# Patient Record
Sex: MALE | Race: BLACK OR AFRICAN AMERICAN | Employment: UNEMPLOYED | URBAN - METROPOLITAN AREA
[De-identification: names, ages, dates, MRNs, and addresses within clinical notes are randomized per-mention and may not be internally consistent; named-entity substitution may affect disease eponyms.]

---

## 2018-01-18 NOTE — PROGRESS NOTES
Assessment    1  Encounter for preventive health examination (V70 0) (Z00 00)   2  ADD (attention deficit disorder) without hyperactivity (314 00) (F90 0)    Plan  ADD (attention deficit disorder) without hyperactivity    · Amphetamine-Dextroamphet ER 10 MG Oral Capsule Extended Release 24 Hour;  ONE TABLET IN THE MORNING DAILY   · Follow-up visit in 1 month Evaluation and Treatment  Follow-up  Status: Complete  Done:  [id number]  Health Maintenance    · Always use a seat belt and shoulder strap when riding or driving a motor vehicle ;  Status:Complete;   Done: 82JSK9810   · Brush your teeth 3 times a day and floss at least once a day ; Status:Complete;   Done:  60XCG4152   · Decreasing the stress in your life may help your condition improve ; Status:Complete;    Done: 61PSA7620   · Follow-up visit in 1 year Evaluation and Treatment  Follow-up  Status: Complete  Done:  36DYO2214    Discussion/Summary  Impression: healthy adult male  Currently, he eats a healthy diet  Prostate cancer screening: PSA is not indicated  Colorectal cancer screening: colorectal cancer screening is not indicated  Advice and education were given regarding seat belt use  Chief Complaint  HM      History of Present Illness  HM, Adult Male: The patient is being seen for a health maintenance evaluation  The last health maintenance visit was 3 year(s) ago  General Health: The patient's health since the last visit is described as good  Screening:   HPI: Wondering about restarting Adderall for ADD as he had in high school  Able to sleep well  Has trouble with focus, trouble completing work tasks, trouble concentrating on conversation      Review of Systems    Constitutional: not feeling poorly, no recent weight gain, not feeling tired and no recent weight loss  Eyes: no eyesight problems  ENT: no nasal discharge  Cardiovascular: no chest pain and no palpitations  Respiratory: no cough and no shortness of breath during exertion  Gastrointestinal: no abdominal pain  Musculoskeletal: no arthralgias and no myalgias  Integumentary: no rashes  Neurological: no headache, no dizziness and no fainting  Psychiatric: no anxiety, no sleep disturbances and no depression  Hematologic/Lymphatic: no tendency for easy bruising  Active Problems    1  Cough (786 2) (R05)   2  Depression (311) (F32 9)   3  Flu-like symptoms (780 99) (R68 89)   4  Streptococcal pharyngitis (034 0) (J02 0)    Past Medical History    · History of Asthma (493 90) (J45 909)   · History of Dysuria (788 1) (R30 0)   · Streptococcal pharyngitis (034 0) (J02 0)    Family History    · Family history of Reflux   · Family history of Rhinitis    · No pertinent family history    · Family history of Allergic disorder   · Family history of Asthma    Social History    · Current some day smoker (305 1) (F17 210)   · Full-time employment   · Marijuana   · Non drinker / no alcohol use   · Single    Current Meds   1  Fluticasone Propionate 50 MCG/ACT Nasal Suspension; USE 2 SPRAYS IN EACH   NOSTRIL ONCE DAILY; Therapy: 28BVY9292 to (Evaluate:51Hkq8773)  Requested for: 84TPM5311; Last   Rx:21Nov2014 Ordered    Allergies    1  No Known Drug Allergies    Vitals   Recorded: 16HNK6456 08:52AM   Temperature 98 F, Tympanic   Heart Rate 60, L Radial   Pulse Quality Regular   Systolic 548, LUE, Sitting   Diastolic 74, LUE, Sitting   Height 5 ft 11 in   Weight 150 lb 3 2 oz   BMI Calculated 20 95   BSA Calculated 1 86     Physical Exam    Constitutional   General appearance: No acute distress, well appearing and well nourished  Head and Face   Head and face: Normal     Eyes   Conjunctiva and lids: No erythema, swelling or discharge  Ears, Nose, Mouth, and Throat   External inspection of ears and nose: Normal     Otoscopic examination: Tympanic membranes translucent with normal light reflex  Canals patent without erythema      Hearing: Normal     Nasal mucosa, septum, and turbinates: Normal without edema or erythema  Lips, teeth, and gums: Normal, good dentition  Oropharynx: Normal with no erythema, edema, exudate or lesions  Neck   Neck: Supple, symmetric, trachea midline, no masses  Thyroid: Normal, no thyromegaly  Pulmonary   Respiratory effort: No increased work of breathing or signs of respiratory distress  Auscultation of lungs: Clear to auscultation  Cardiovascular   Auscultation of heart: Normal rate and rhythm, normal S1 and S2, no murmurs  Carotid pulses: 2+ bilaterally  Peripheral vascular exam: Normal     Examination of extremities for edema and/or varicosities: Normal     Abdomen   Abdomen: Non-tender, no masses  Liver and spleen: No hepatomegaly or splenomegaly  Lymphatic   Palpation of lymph nodes in neck: No lymphadenopathy  Musculoskeletal   Gait and station: Normal     Inspection/palpation of digits and nails: Normal without clubbing or cyanosis  Inspection/palpation of joints, bones, and muscles: Normal     Range of motion: Normal     Stability: Normal     Muscle strength/tone: Normal     Skin   Skin and subcutaneous tissue: Normal without rashes or lesions  Psychiatric   Judgment and insight: Normal     Orientation to person, place and time: Normal     Recent and remote memory: Intact      Mood and affect: Normal        Results/Data  PHQ-2 Adult Depression Screening 25Mar2016 09:35AM User, Ahs     Test Name Result Flag Reference   PHQ-2 Adult Depression Score 0     Q1: 0, Q2: 0   PHQ-2 Adult Depression Screening Negative         Signatures   Electronically signed by : Teresa Parham MD; Mar 27 2016  8:33PM EST                       (Author)

## 2018-01-18 NOTE — MISCELLANEOUS
Message   Recorded as Task   Date: 04/20/2016 04:53 PM, Created By: Christian Gutiérrez   Task Name: Call Back   Assigned To: [address] [place] [address]   Regarding Patient: Leilani Sal, Status: Active   Comment:    Christian Gutiérrez - 20 Apr 2016 4:53 PM     TASK CREATED  Patient calling because the Vyvanse is too expensive  He is asking for an alternative  Nelly Thorne - 21 Apr 2016 10:03 AM     TASK EDITED  pt mom called back that the Vyvanse is $30 per month - which is too much    is there a generic of this or something else that he can take    pcb @ [phone]  (this is mom's work #)   Francisco Michel - 22 Apr 2016 7:33 AM     TASK REPLIED TO: Previously Assigned To [address] [place] [address]  We can continue the Adderall as a twice a day dose  New Rx on printer  Angeles Scales - 22 Apr 2016 11:15 AM     TASK EDITED  Patient aware        Active Problems    1  ADD (attention deficit disorder) without hyperactivity (314 00) (F90 0)   2  Cough (786 2) (R05)   3  Depression (311) (F32 9)   4  Flu-like symptoms (780 99) (R68 89)   5  Streptococcal pharyngitis (034 0) (J02 0)    Current Meds   1  Amphetamine-Dextroamphet ER 10 MG Oral Capsule Extended Release 24 Hour; Take   1 capsule twice daily with breakfast and with lunch; Therapy: 63RPY2809 to (Last Rx:22Apr2016) Ordered   2  Fluticasone Propionate 50 MCG/ACT Nasal Suspension; USE 2 SPRAYS IN EACH   NOSTRIL ONCE DAILY; Therapy: 29IJY8910 to (Evaluate:27Coa2916)  Requested for: 37EYF1124; Last   Rx:21Nov2014 Ordered    Allergies    1   No Known Drug Allergies    Signatures   Electronically signed by : Mari Gupta MD; Apr 22 2016 11:20AM EST                       (Co-author)

## 2018-02-14 ENCOUNTER — OFFICE VISIT (OUTPATIENT)
Dept: FAMILY MEDICINE CLINIC | Facility: HOSPITAL | Age: 23
End: 2018-02-14
Payer: COMMERCIAL

## 2018-02-14 VITALS
HEIGHT: 72 IN | SYSTOLIC BLOOD PRESSURE: 112 MMHG | DIASTOLIC BLOOD PRESSURE: 76 MMHG | TEMPERATURE: 99.6 F | BODY MASS INDEX: 20.4 KG/M2 | HEART RATE: 100 BPM | WEIGHT: 150.6 LBS

## 2018-02-14 DIAGNOSIS — Z00.00 WELLNESS EXAMINATION: Primary | ICD-10-CM

## 2018-02-14 DIAGNOSIS — F98.8 ATTENTION DEFICIT DISORDER, UNSPECIFIED HYPERACTIVITY PRESENCE: ICD-10-CM

## 2018-02-14 PROCEDURE — 99395 PREV VISIT EST AGE 18-39: CPT | Performed by: FAMILY MEDICINE

## 2018-02-14 RX ORDER — FLUTICASONE PROPIONATE 50 MCG
2 SPRAY, SUSPENSION (ML) NASAL DAILY
COMMUNITY
Start: 2011-05-05 | End: 2020-06-02 | Stop reason: HOSPADM

## 2018-02-14 NOTE — PROGRESS NOTES
Assessment/Plan:       Diagnoses and all orders for this visit:    Wellness examination    Attention deficit disorder, unspecified hyperactivity presence  Comments:  OK to retrial of Vyvanse  Orders:  -     lisdexamfetamine (VYVANSE) 30 MG capsule; Take 1 capsule (30 mg total) by mouth daily Max Daily Amount: 30 mg    Other orders  -     Discontinue: lisdexamfetamine (VYVANSE) 30 MG capsule; Take 1 capsule by mouth Daily  -     fluticasone (FLONASE) 50 mcg/act nasal spray; 2 sprays into each nostril daily          Subjective:      Patient ID: Basilio Malhotra is a 25 y o  male  Wondered about restarting the Vyvanse for ADD  He never restarted at last visit because he couldn't afford it  Still having focus difficulties  Not currently employed         The following portions of the patient's history were reviewed and updated as appropriate: allergies, current medications, past family history, past medical history, past social history, past surgical history and problem list     Review of Systems   Constitutional: Negative for chills, fatigue and fever  HENT: Negative for dental problem and postnasal drip  Eyes: Negative for pain  Respiratory: Negative for chest tightness, shortness of breath and wheezing  Cardiovascular: Negative for chest pain  Genitourinary: Negative for testicular pain  Neurological: Negative for light-headedness  Hematological: Does not bruise/bleed easily  Psychiatric/Behavioral: Positive for decreased concentration  The patient is not nervous/anxious  Objective:    Vitals:    02/14/18 1459   BP: 112/76   Pulse: 100   Temp: 99 6 °F (37 6 °C)        Physical Exam   Constitutional: He is oriented to person, place, and time  He appears well-developed and well-nourished  HENT:   Head: Normocephalic and atraumatic     Right Ear: External ear normal    Left Ear: External ear normal    Nose: Nose normal    Mouth/Throat: Oropharynx is clear and moist    Eyes: Conjunctivae are normal    Neck: No thyromegaly present  Cardiovascular: Normal rate, regular rhythm, normal heart sounds and intact distal pulses  Pulmonary/Chest: Effort normal and breath sounds normal    Abdominal: Soft  Bowel sounds are normal    Musculoskeletal: Normal range of motion  Neurological: He is alert and oriented to person, place, and time  Psychiatric: He has a normal mood and affect   His behavior is normal  Judgment and thought content normal

## 2018-02-15 PROBLEM — Z00.00 WELLNESS EXAMINATION: Status: ACTIVE | Noted: 2018-02-15

## 2018-02-15 PROBLEM — F98.8 ATTENTION DEFICIT DISORDER: Status: ACTIVE | Noted: 2018-02-15

## 2018-03-14 ENCOUNTER — OFFICE VISIT (OUTPATIENT)
Dept: FAMILY MEDICINE CLINIC | Facility: HOSPITAL | Age: 23
End: 2018-03-14
Payer: COMMERCIAL

## 2018-03-14 VITALS
WEIGHT: 152 LBS | HEIGHT: 72 IN | DIASTOLIC BLOOD PRESSURE: 70 MMHG | HEART RATE: 68 BPM | BODY MASS INDEX: 20.59 KG/M2 | SYSTOLIC BLOOD PRESSURE: 126 MMHG | TEMPERATURE: 98.8 F

## 2018-03-14 DIAGNOSIS — F98.8 ADD (ATTENTION DEFICIT DISORDER) WITHOUT HYPERACTIVITY: Primary | ICD-10-CM

## 2018-03-14 PROCEDURE — 3008F BODY MASS INDEX DOCD: CPT | Performed by: FAMILY MEDICINE

## 2018-03-14 PROCEDURE — 99213 OFFICE O/P EST LOW 20 MIN: CPT | Performed by: FAMILY MEDICINE

## 2018-03-14 RX ORDER — DEXTROAMPHETAMINE SACCHARATE, AMPHETAMINE ASPARTATE, DEXTROAMPHETAMINE SULFATE AND AMPHETAMINE SULFATE 3.75; 3.75; 3.75; 3.75 MG/1; MG/1; MG/1; MG/1
15 TABLET ORAL 2 TIMES DAILY
Qty: 60 TABLET | Refills: 0 | Status: SHIPPED | OUTPATIENT
Start: 2018-03-14 | End: 2020-06-02 | Stop reason: HOSPADM

## 2018-03-14 NOTE — PROGRESS NOTES
Assessment/Plan:         Diagnoses and all orders for this visit:    ADD (attention deficit disorder) without hyperactivity  -     amphetamine-dextroamphetamine (ADDERALL) 15 MG tablet; Take 1 tablet (15 mg total) by mouth 2 (two) times a day Max Daily Amount: 30 mg          Subjective:      Patient ID: Arnulfo Stevens is a 21 y o  male  Feels he is getting too long a duration of action with Vyvanse to 10 pm   He feels he was doing better with the Adderall twice a day as before        The following portions of the patient's history were reviewed and updated as appropriate: allergies, current medications, past family history, past medical history, past social history, past surgical history and problem list     Review of Systems   Constitutional: Negative for fatigue  HENT: Negative for congestion  Eyes: Negative for pain  Respiratory: Negative for cough and shortness of breath  Cardiovascular: Negative for chest pain  Gastrointestinal: Negative for constipation and diarrhea  Genitourinary: Negative for difficulty urinating  Neurological: Negative for headaches  Objective:      /70 (BP Location: Left arm, Patient Position: Sitting, Cuff Size: Standard)   Pulse 68   Temp 98 8 °F (37 1 °C) (Tympanic)   Ht 5' 11 5" (1 816 m)   Wt 68 9 kg (152 lb)   BMI 20 90 kg/m²          Physical Exam   Constitutional: He is oriented to person, place, and time  He appears well-developed and well-nourished  Eyes: Conjunctivae are normal    Cardiovascular: Normal rate, regular rhythm, normal heart sounds and intact distal pulses  Pulmonary/Chest: Effort normal and breath sounds normal    Neurological: He is alert and oriented to person, place, and time  Psychiatric: He has a normal mood and affect   His behavior is normal  Judgment and thought content normal

## 2018-03-16 NOTE — PATIENT INSTRUCTIONS
ADHD in Adults   AMBULATORY CARE:   Attention deficit hyperactivity disorder (ADHD)  is a condition that affects behavior  You may be overactive and have a short attention span  ADHD interferes with how you function in your daily activities at work, school, or home  ADHD may also cause you to have problems getting along with other people  Signs and symptoms of ADHD:  ADHD has 2 main types, based on signs and symptoms  You may have a combination of the 2 main types  A combination is the most common type of ADHD  · Inattention:      ¨ Get easily distracted or have a hard time focusing    ¨ Avoid tasks that need full attention    ¨ Not follow or easily forget instructions or directions    ¨ Not listen, or drift away when spoken to    ¨ Make careless mistakes or lose things    ¨ Have problems organizing tasks or chores and managing time    · Hyperactivity and impulsivity:      ¨ Become easily bored and not finish tasks    ¨ Talk a lot, interrupt, or intrude into conversations or games    ¨ Change schools or jobs often    ¨ Feel stressed, nervous, or worried much of the time    ¨ Have problems doing quiet activities or sitting still    ¨ Have an addictive behavior such as use of alcohol or illegal drugs, shopping, eating, or working too much    ¨ Have more energy than seems normal  Call 911 if:   · You feel like hurting yourself or someone else  Seek care immediately if:   · You have a seizure  · You have trouble breathing, chest pains, or a fast heartbeat  Contact your healthcare provider if:   · You feel you cannot cope at home, work, or school  · You have new symptoms since the last time you visited your healthcare provider  · Your symptoms are getting worse  · You have questions or concerns about your condition or care  Treatment for ADHD:  The goal of treatment is to help you learn how to control your behavior  A combination of therapy and medication is usually most effective for treating ADHD  You may need any of the following:  · Behavior therapy  is used to help you learn to control your actions and improve your behavior  This is done by teaching you how to change your behavior by looking at the results of your actions  · Psychotherapy  is also called talk therapy  You may have one-on-one visits with a therapist or with others in a group setting  · Stimulants  help you pay attention, concentrate better, and manage your energy  · Antidepressants  help decrease or prevent depression or anxiety  It can also be used to treat other behavior problems  Manage ADHD:   · Reduce stress  Stress may make your ADHD worse  Ask about ways to calm your body and mind  These may include deep breathing, muscle relaxation, music, and biofeedback  Talk to someone about things that upset you  · Learn more about ADHD  The more you know about ADHD, the better you will be able to help yourself  Read books, work with your therapist, and find the support of other people with ADHD  · Do not drink alcohol  Alcohol may make your symptoms worse  · Create a regular sleep schedule  Sleep can help decrease your symptoms  Try to go to bed and wake up at the same times each day  Do not watch TV, use the computer, or play video games before bed  Electronic devices can make it hard for you to sleep or to stay asleep  · Eat a variety of healthy foods  Healthy foods can help increase your concentration and make you feel calmer  Healthy foods include fruits, vegetables, low-fat dairy products, lean meats, fish, whole-grain breads, and cooked beans  Ask your healthcare provider if you need to be on a special diet  Follow up with your healthcare provider as directed:  Write down your questions so you remember to ask them during your visits  © 2017 [employer] Information is for End User's use only and may not be sold, redistributed or otherwise used for commercial purposes   All illustrations and images included in CareNotes® are the copyrighted property of [employer]  or [employer]  The above information is an  only  It is not intended as medical advice for individual conditions or treatments  Talk to your doctor, nurse or pharmacist before following any medical regimen to see if it is safe and effective for you

## 2018-04-19 ENCOUNTER — TELEPHONE (OUTPATIENT)
Dept: FAMILY MEDICINE CLINIC | Facility: HOSPITAL | Age: 23
End: 2018-04-19

## 2018-04-21 NOTE — TELEPHONE ENCOUNTER
Adderal not working, states he feels confused on it  Not sure if he needs brand name   Please advise, TY

## 2018-04-21 NOTE — TELEPHONE ENCOUNTER
Brand name would not help  I dont feel this is the right treatment for him and I would like him to see a neurologist to determine if he really does have ADD  Refer to Dr Roberta Campos

## 2019-10-07 ENCOUNTER — TELEPHONE (OUTPATIENT)
Dept: FAMILY MEDICINE CLINIC | Facility: HOSPITAL | Age: 24
End: 2019-10-07

## 2020-01-07 ENCOUNTER — OFFICE VISIT (OUTPATIENT)
Dept: FAMILY MEDICINE CLINIC | Facility: HOSPITAL | Age: 25
End: 2020-01-07
Payer: COMMERCIAL

## 2020-01-07 ENCOUNTER — TELEPHONE (OUTPATIENT)
Dept: FAMILY MEDICINE CLINIC | Facility: HOSPITAL | Age: 25
End: 2020-01-07

## 2020-01-07 VITALS
TEMPERATURE: 99 F | BODY MASS INDEX: 19.61 KG/M2 | DIASTOLIC BLOOD PRESSURE: 78 MMHG | SYSTOLIC BLOOD PRESSURE: 138 MMHG | WEIGHT: 148 LBS | HEART RATE: 80 BPM | HEIGHT: 73 IN

## 2020-01-07 DIAGNOSIS — F41.9 ANXIETY: Primary | ICD-10-CM

## 2020-01-07 DIAGNOSIS — F51.05 INSOMNIA SECONDARY TO ANXIETY: ICD-10-CM

## 2020-01-07 DIAGNOSIS — F41.9 INSOMNIA SECONDARY TO ANXIETY: ICD-10-CM

## 2020-01-07 PROCEDURE — 3008F BODY MASS INDEX DOCD: CPT | Performed by: FAMILY MEDICINE

## 2020-01-07 PROCEDURE — 99213 OFFICE O/P EST LOW 20 MIN: CPT | Performed by: FAMILY MEDICINE

## 2020-01-07 RX ORDER — HYDROXYZINE HYDROCHLORIDE 10 MG/1
10 TABLET, FILM COATED ORAL
Qty: 15 TABLET | Refills: 0 | Status: SHIPPED | OUTPATIENT
Start: 2020-01-07 | End: 2020-02-18 | Stop reason: SDUPTHER

## 2020-01-07 RX ORDER — ESCITALOPRAM OXALATE 10 MG/1
10 TABLET ORAL DAILY
Qty: 30 TABLET | Refills: 1 | Status: SHIPPED | OUTPATIENT
Start: 2020-01-07 | End: 2020-02-18 | Stop reason: SDUPTHER

## 2020-01-07 NOTE — PROGRESS NOTES
Assessment/Plan:         Diagnoses and all orders for this visit:    Anxiety  -     escitalopram (LEXAPRO) 10 mg tablet; Take 1 tablet (10 mg total) by mouth daily    Insomnia secondary to anxiety  -     hydrOXYzine HCL (ATARAX) 10 mg tablet; Take 1 tablet (10 mg total) by mouth daily at bedtime          Subjective:      Patient ID: Earnest Moreno is a 25 y o  male  Visit to evaluate anxiety especially occurring in social situations    Trouble staying asleep as well, wakes several times a night  We had tried Adderall for prior ADD symptoms but did not do well with it  He is not employed and really doesn't do much except hang around home  The following portions of the patient's history were reviewed and updated as appropriate: allergies, current medications, past family history, past medical history, past social history, past surgical history and problem list     Review of Systems   Constitutional: Negative for unexpected weight change  HENT: Negative  Respiratory: Negative  Genitourinary: Negative  Musculoskeletal: Negative  Skin: Negative  Neurological: Negative  Hematological: Negative  Psychiatric/Behavioral: Positive for dysphoric mood and sleep disturbance  Negative for agitation and suicidal ideas  The patient is nervous/anxious  All other systems reviewed and are negative  Objective:      /78   Pulse 80   Temp 99 °F (37 2 °C)   Ht 6' 1" (1 854 m)   Wt 67 1 kg (148 lb)   BMI 19 53 kg/m²          Physical Exam   Constitutional: He is oriented to person, place, and time  He appears well-developed and well-nourished  Cardiovascular: Normal rate, regular rhythm, normal heart sounds and intact distal pulses  Pulmonary/Chest: Effort normal and breath sounds normal    Musculoskeletal: He exhibits no edema  Neurological: He is alert and oriented to person, place, and time  Nursing note and vitals reviewed

## 2020-01-07 NOTE — TELEPHONE ENCOUNTER
MOM CALLED EXPRESSED CONCERNS FOR  DEPRESSION  WANTS A REFERRAL TO  OR     THERAPIST   GAVE THEM THE NUMBER TO [employer]  PHONE AND ADDRESS  [phone]     ENCOURAGED THE WALK IN CLINIC AT [address] [facility] TO TAKE     TO ED FOR EVALUATION    PLEASE ADVISE  THANKS

## 2020-01-07 NOTE — TELEPHONE ENCOUNTER
Mom asking for a call from Dr Raúl Chun  She is concerned regarding patient and his depression and possible suicidal tendency  No communication consent in chart

## 2020-02-18 ENCOUNTER — OFFICE VISIT (OUTPATIENT)
Dept: FAMILY MEDICINE CLINIC | Facility: HOSPITAL | Age: 25
End: 2020-02-18
Payer: COMMERCIAL

## 2020-02-18 VITALS
HEART RATE: 92 BPM | TEMPERATURE: 98.7 F | DIASTOLIC BLOOD PRESSURE: 80 MMHG | BODY MASS INDEX: 19.48 KG/M2 | HEIGHT: 73 IN | WEIGHT: 147 LBS | SYSTOLIC BLOOD PRESSURE: 120 MMHG

## 2020-02-18 DIAGNOSIS — F41.9 INSOMNIA SECONDARY TO ANXIETY: ICD-10-CM

## 2020-02-18 DIAGNOSIS — F41.9 ANXIETY: Primary | ICD-10-CM

## 2020-02-18 DIAGNOSIS — F51.05 INSOMNIA SECONDARY TO ANXIETY: ICD-10-CM

## 2020-02-18 PROCEDURE — 99213 OFFICE O/P EST LOW 20 MIN: CPT | Performed by: FAMILY MEDICINE

## 2020-02-18 PROCEDURE — 3008F BODY MASS INDEX DOCD: CPT | Performed by: FAMILY MEDICINE

## 2020-02-18 RX ORDER — HYDROXYZINE HYDROCHLORIDE 25 MG/1
25 TABLET, FILM COATED ORAL
Qty: 30 TABLET | Refills: 1 | Status: SHIPPED | OUTPATIENT
Start: 2020-02-18 | End: 2020-03-23

## 2020-02-18 RX ORDER — ESCITALOPRAM OXALATE 10 MG/1
15 TABLET ORAL DAILY
Qty: 45 TABLET | Refills: 3 | Status: SHIPPED | OUTPATIENT
Start: 2020-02-18 | End: 2020-06-02 | Stop reason: HOSPADM

## 2020-02-18 NOTE — PROGRESS NOTES
Assessment/Plan:         Diagnoses and all orders for this visit:    Anxiety  -     escitalopram (LEXAPRO) 10 mg tablet; Take 1 5 tablets (15 mg total) by mouth daily    Insomnia secondary to anxiety  -     hydrOXYzine HCL (ATARAX) 25 mg tablet; Take 1 tablet (25 mg total) by mouth daily at bedtime          Subjective:      Patient ID: Sophia Marion is a 25 y o  male  6 week follow up    Started Lexapro for social anxiety and insomnia issues  Feels better able to deal with situations and doesn't get as upset  Takes it in the morning although he hasnt been on it for 2 weeks because ehe didn't get the refill    Taking Hydroxyzine at bedtime without much benefit  The following portions of the patient's history were reviewed and updated as appropriate: allergies, current medications, past family history, past medical history, past social history, past surgical history and problem list     Review of Systems   Constitutional: Negative for unexpected weight change  HENT: Negative  Hematological: Negative  Psychiatric/Behavioral: Positive for sleep disturbance  Negative for confusion  The patient is nervous/anxious  All other systems reviewed and are negative  Objective:      /80   Pulse 92   Temp 98 7 °F (37 1 °C)   Ht 6' 1" (1 854 m)   Wt 66 7 kg (147 lb)   BMI 19 39 kg/m²          Physical Exam   Constitutional: He is oriented to person, place, and time  He appears well-developed and well-nourished  Musculoskeletal: He exhibits no edema  Neurological: He is alert and oriented to person, place, and time  Psychiatric: He has a normal mood and affect  His behavior is normal  Judgment and thought content normal    Nursing note and vitals reviewed

## 2020-03-23 DIAGNOSIS — F41.9 INSOMNIA SECONDARY TO ANXIETY: ICD-10-CM

## 2020-03-23 DIAGNOSIS — F51.05 INSOMNIA SECONDARY TO ANXIETY: ICD-10-CM

## 2020-03-23 RX ORDER — HYDROXYZINE HYDROCHLORIDE 25 MG/1
TABLET, FILM COATED ORAL
Qty: 30 TABLET | Refills: 1 | Status: SHIPPED | OUTPATIENT
Start: 2020-03-23 | End: 2020-06-02 | Stop reason: HOSPADM

## 2020-05-15 ENCOUNTER — OFFICE VISIT (OUTPATIENT)
Dept: FAMILY MEDICINE CLINIC | Facility: HOSPITAL | Age: 25
End: 2020-05-15
Payer: COMMERCIAL

## 2020-05-15 VITALS
BODY MASS INDEX: 18.55 KG/M2 | HEIGHT: 73 IN | SYSTOLIC BLOOD PRESSURE: 108 MMHG | WEIGHT: 140 LBS | DIASTOLIC BLOOD PRESSURE: 70 MMHG | TEMPERATURE: 98 F | HEART RATE: 85 BPM

## 2020-05-15 DIAGNOSIS — R44.0 AUDITORY HALLUCINATIONS: Primary | ICD-10-CM

## 2020-05-15 DIAGNOSIS — S09.90XS INJURY OF HEAD, SEQUELA: ICD-10-CM

## 2020-05-15 DIAGNOSIS — F51.05 INSOMNIA SECONDARY TO ANXIETY: ICD-10-CM

## 2020-05-15 DIAGNOSIS — F41.9 INSOMNIA SECONDARY TO ANXIETY: ICD-10-CM

## 2020-05-15 DIAGNOSIS — R41.82 ALTERED MENTAL STATUS, UNSPECIFIED ALTERED MENTAL STATUS TYPE: ICD-10-CM

## 2020-05-15 DIAGNOSIS — F41.9 ANXIETY: ICD-10-CM

## 2020-05-15 PROBLEM — S09.90XA HEAD INJURY: Status: ACTIVE | Noted: 2020-05-15

## 2020-05-15 PROCEDURE — 99214 OFFICE O/P EST MOD 30 MIN: CPT | Performed by: FAMILY MEDICINE

## 2020-05-15 PROCEDURE — 3008F BODY MASS INDEX DOCD: CPT | Performed by: FAMILY MEDICINE

## 2020-05-22 ENCOUNTER — TELEPHONE (OUTPATIENT)
Dept: FAMILY MEDICINE CLINIC | Facility: HOSPITAL | Age: 25
End: 2020-05-22

## 2020-05-22 ENCOUNTER — HOSPITAL ENCOUNTER (OUTPATIENT)
Dept: CT IMAGING | Facility: HOSPITAL | Age: 25
Discharge: HOME | End: 2020-05-22
Payer: COMMERCIAL

## 2020-05-22 DIAGNOSIS — R41.82 ALTERED MENTAL STATUS, UNSPECIFIED ALTERED MENTAL STATUS TYPE: ICD-10-CM

## 2020-05-22 PROCEDURE — 70460 CT HEAD/BRAIN W/DYE: CPT

## 2020-05-22 RX ADMIN — IOHEXOL 100 ML: 350 INJECTION, SOLUTION INTRAVENOUS at 19:13

## 2020-05-26 ENCOUNTER — TELEPHONE (OUTPATIENT)
Dept: PSYCHIATRY | Facility: CLINIC | Age: 25
End: 2020-05-26

## 2020-05-27 ENCOUNTER — HOSPITAL ENCOUNTER (INPATIENT)
Facility: HOSPITAL | Age: 25
LOS: 6 days | Discharge: HOME | DRG: 885 | End: 2020-06-02
Attending: PSYCHIATRY & NEUROLOGY | Admitting: PSYCHIATRY & NEUROLOGY
Payer: COMMERCIAL

## 2020-05-27 ENCOUNTER — HOSPITAL ENCOUNTER (EMERGENCY)
Facility: HOSPITAL | Age: 25
End: 2020-05-27
Attending: EMERGENCY MEDICINE | Admitting: EMERGENCY MEDICINE
Payer: COMMERCIAL

## 2020-05-27 VITALS
HEART RATE: 98 BPM | RESPIRATION RATE: 18 BRPM | WEIGHT: 160 LBS | SYSTOLIC BLOOD PRESSURE: 148 MMHG | TEMPERATURE: 97.8 F | BODY MASS INDEX: 21.11 KG/M2 | DIASTOLIC BLOOD PRESSURE: 78 MMHG | OXYGEN SATURATION: 100 %

## 2020-05-27 DIAGNOSIS — F41.9 ANXIETY: Primary | ICD-10-CM

## 2020-05-27 DIAGNOSIS — F32.A DEPRESSION: Primary | ICD-10-CM

## 2020-05-27 DIAGNOSIS — F29 PSYCHOTIC DISORDER (HCC): ICD-10-CM

## 2020-05-27 DIAGNOSIS — R45.851 SUICIDAL IDEATION: ICD-10-CM

## 2020-05-27 DIAGNOSIS — F32.A DEPRESSION: ICD-10-CM

## 2020-05-27 LAB
AMPHETAMINES SERPL QL SCN: POSITIVE
BARBITURATES UR QL: NEGATIVE
BENZODIAZ UR QL: NEGATIVE
COCAINE UR QL: NEGATIVE
ETHANOL EXG-MCNC: 0 MG/DL
METHADONE UR QL: NEGATIVE
OPIATES UR QL SCN: NEGATIVE
PCP UR QL: NEGATIVE
SARS-COV-2 RNA RESP QL NAA+PROBE: NEGATIVE
THC UR QL: NEGATIVE

## 2020-05-27 PROCEDURE — 99285 EMERGENCY DEPT VISIT HI MDM: CPT | Performed by: EMERGENCY MEDICINE

## 2020-05-27 PROCEDURE — 87635 SARS-COV-2 COVID-19 AMP PRB: CPT | Performed by: EMERGENCY MEDICINE

## 2020-05-27 PROCEDURE — 82075 ASSAY OF BREATH ETHANOL: CPT | Performed by: EMERGENCY MEDICINE

## 2020-05-27 PROCEDURE — 99285 EMERGENCY DEPT VISIT HI MDM: CPT

## 2020-05-27 PROCEDURE — 80307 DRUG TEST PRSMV CHEM ANLYZR: CPT | Performed by: EMERGENCY MEDICINE

## 2020-05-27 RX ORDER — HALOPERIDOL 5 MG
5 TABLET ORAL EVERY 6 HOURS PRN
Status: DISCONTINUED | OUTPATIENT
Start: 2020-05-27 | End: 2020-06-02 | Stop reason: HOSPADM

## 2020-05-27 RX ORDER — ACETAMINOPHEN 325 MG/1
650 TABLET ORAL EVERY 6 HOURS PRN
Status: CANCELLED | OUTPATIENT
Start: 2020-05-27

## 2020-05-27 RX ORDER — LORAZEPAM 2 MG/ML
2 INJECTION INTRAMUSCULAR EVERY 6 HOURS PRN
Status: DISCONTINUED | OUTPATIENT
Start: 2020-05-27 | End: 2020-06-02 | Stop reason: HOSPADM

## 2020-05-27 RX ORDER — MAGNESIUM HYDROXIDE/ALUMINUM HYDROXICE/SIMETHICONE 120; 1200; 1200 MG/30ML; MG/30ML; MG/30ML
30 SUSPENSION ORAL EVERY 4 HOURS PRN
Status: DISCONTINUED | OUTPATIENT
Start: 2020-05-27 | End: 2020-06-02 | Stop reason: HOSPADM

## 2020-05-27 RX ORDER — MAGNESIUM HYDROXIDE/ALUMINUM HYDROXICE/SIMETHICONE 120; 1200; 1200 MG/30ML; MG/30ML; MG/30ML
30 SUSPENSION ORAL EVERY 4 HOURS PRN
Status: CANCELLED | OUTPATIENT
Start: 2020-05-27

## 2020-05-27 RX ORDER — HALOPERIDOL 5 MG/ML
5 INJECTION INTRAMUSCULAR EVERY 6 HOURS PRN
Status: DISCONTINUED | OUTPATIENT
Start: 2020-05-27 | End: 2020-06-02 | Stop reason: HOSPADM

## 2020-05-27 RX ORDER — TRAZODONE HYDROCHLORIDE 50 MG/1
50 TABLET ORAL
Status: CANCELLED | OUTPATIENT
Start: 2020-05-27

## 2020-05-27 RX ORDER — HALOPERIDOL 5 MG
5 TABLET ORAL EVERY 6 HOURS PRN
Status: CANCELLED | OUTPATIENT
Start: 2020-05-27

## 2020-05-27 RX ORDER — LORAZEPAM 2 MG/ML
2 INJECTION INTRAMUSCULAR EVERY 6 HOURS PRN
Status: CANCELLED | OUTPATIENT
Start: 2020-05-27

## 2020-05-27 RX ORDER — LORAZEPAM 1 MG/1
1 TABLET ORAL ONCE
Status: COMPLETED | OUTPATIENT
Start: 2020-05-27 | End: 2020-05-27

## 2020-05-27 RX ORDER — LORAZEPAM 1 MG/1
2 TABLET ORAL EVERY 4 HOURS PRN
Status: DISCONTINUED | OUTPATIENT
Start: 2020-05-27 | End: 2020-05-28

## 2020-05-27 RX ORDER — ACETAMINOPHEN 325 MG/1
650 TABLET ORAL EVERY 6 HOURS PRN
Status: DISCONTINUED | OUTPATIENT
Start: 2020-05-27 | End: 2020-06-02 | Stop reason: HOSPADM

## 2020-05-27 RX ORDER — LORAZEPAM 1 MG/1
2 TABLET ORAL EVERY 4 HOURS PRN
Status: CANCELLED | OUTPATIENT
Start: 2020-05-27

## 2020-05-27 RX ORDER — HALOPERIDOL 5 MG/ML
5 INJECTION INTRAMUSCULAR EVERY 6 HOURS PRN
Status: CANCELLED | OUTPATIENT
Start: 2020-05-27

## 2020-05-27 RX ORDER — TRAZODONE HYDROCHLORIDE 50 MG/1
50 TABLET ORAL
Status: DISCONTINUED | OUTPATIENT
Start: 2020-05-27 | End: 2020-06-02 | Stop reason: HOSPADM

## 2020-05-27 RX ORDER — LANOLIN ALCOHOL/MO/W.PET/CERES
3 CREAM (GRAM) TOPICAL ONCE
Status: DISCONTINUED | OUTPATIENT
Start: 2020-05-27 | End: 2020-05-27 | Stop reason: HOSPADM

## 2020-05-27 RX ADMIN — LORAZEPAM 1 MG: 1 TABLET ORAL at 19:13

## 2020-05-27 RX ADMIN — HALOPERIDOL 5 MG: 5 TABLET ORAL at 23:03

## 2020-05-27 RX ADMIN — LORAZEPAM 2 MG: 1 TABLET ORAL at 23:03

## 2020-05-28 PROBLEM — F32.A DEPRESSION: Status: ACTIVE | Noted: 2020-05-28

## 2020-05-28 PROBLEM — F29 PSYCHOTIC DISORDER (HCC): Status: ACTIVE | Noted: 2020-05-28

## 2020-05-28 LAB
ALBUMIN SERPL BCP-MCNC: 4 G/DL (ref 3.5–5)
ALP SERPL-CCNC: 34 U/L (ref 46–116)
ALT SERPL W P-5'-P-CCNC: 29 U/L (ref 12–78)
ANION GAP SERPL CALCULATED.3IONS-SCNC: 5 MMOL/L (ref 4–13)
AST SERPL W P-5'-P-CCNC: 29 U/L (ref 5–45)
ATRIAL RATE: 0 BPM
ATRIAL RATE: 0 BPM
ATRIAL RATE: 69 BPM
ATRIAL RATE: 77 BPM
BASOPHILS # BLD AUTO: 0.05 THOUSANDS/ΜL (ref 0–0.1)
BASOPHILS NFR BLD AUTO: 1 % (ref 0–1)
BILIRUB SERPL-MCNC: 0.9 MG/DL (ref 0.2–1)
BUN SERPL-MCNC: 8 MG/DL (ref 5–25)
CALCIUM SERPL-MCNC: 8.9 MG/DL (ref 8.3–10.1)
CHLORIDE SERPL-SCNC: 104 MMOL/L (ref 100–108)
CHOLEST SERPL-MCNC: 163 MG/DL (ref 50–200)
CO2 SERPL-SCNC: 28 MMOL/L (ref 21–32)
CREAT SERPL-MCNC: 1.06 MG/DL (ref 0.6–1.3)
EOSINOPHIL # BLD AUTO: 0.54 THOUSAND/ΜL (ref 0–0.61)
EOSINOPHIL NFR BLD AUTO: 9 % (ref 0–6)
ERYTHROCYTE [DISTWIDTH] IN BLOOD BY AUTOMATED COUNT: 12.5 % (ref 11.6–15.1)
GFR SERPL CREATININE-BSD FRML MDRD: 112 ML/MIN/1.73SQ M
GLUCOSE P FAST SERPL-MCNC: 74 MG/DL (ref 65–99)
GLUCOSE SERPL-MCNC: 74 MG/DL (ref 65–140)
HCT VFR BLD AUTO: 38.6 % (ref 36.5–49.3)
HDLC SERPL-MCNC: 51 MG/DL
HGB BLD-MCNC: 13.5 G/DL (ref 12–17)
IMM GRANULOCYTES # BLD AUTO: 0.01 THOUSAND/UL (ref 0–0.2)
IMM GRANULOCYTES NFR BLD AUTO: 0 % (ref 0–2)
LDLC SERPL CALC-MCNC: 103 MG/DL (ref 0–100)
LYMPHOCYTES # BLD AUTO: 2.29 THOUSANDS/ΜL (ref 0.6–4.47)
LYMPHOCYTES NFR BLD AUTO: 39 % (ref 14–44)
MCH RBC QN AUTO: 29.5 PG (ref 26.8–34.3)
MCHC RBC AUTO-ENTMCNC: 35 G/DL (ref 31.4–37.4)
MCV RBC AUTO: 85 FL (ref 82–98)
MONOCYTES # BLD AUTO: 0.52 THOUSAND/ΜL (ref 0.17–1.22)
MONOCYTES NFR BLD AUTO: 9 % (ref 4–12)
NEUTROPHILS # BLD AUTO: 2.43 THOUSANDS/ΜL (ref 1.85–7.62)
NEUTS SEG NFR BLD AUTO: 42 % (ref 43–75)
NONHDLC SERPL-MCNC: 112 MG/DL
NRBC BLD AUTO-RTO: 0 /100 WBCS
P AXIS: 107 DEGREES
P AXIS: 70 DEGREES
PLATELET # BLD AUTO: 208 THOUSANDS/UL (ref 149–390)
PMV BLD AUTO: 12 FL (ref 8.9–12.7)
POTASSIUM SERPL-SCNC: 3.6 MMOL/L (ref 3.5–5.3)
PR INTERVAL: 158 MS
PR INTERVAL: 160 MS
PROT SERPL-MCNC: 7.2 G/DL (ref 6.4–8.2)
QRS AXIS: 0 DEGREES
QRS AXIS: 0 DEGREES
QRS AXIS: 90 DEGREES
QRS AXIS: 91 DEGREES
QRSD INTERVAL: 0 MS
QRSD INTERVAL: 0 MS
QRSD INTERVAL: 80 MS
QRSD INTERVAL: 90 MS
QT INTERVAL: 0 MS
QT INTERVAL: 0 MS
QT INTERVAL: 400 MS
QT INTERVAL: 406 MS
QTC INTERVAL: 0 MS
QTC INTERVAL: 0 MS
QTC INTERVAL: 435 MS
QTC INTERVAL: 452 MS
RBC # BLD AUTO: 4.57 MILLION/UL (ref 3.88–5.62)
SODIUM SERPL-SCNC: 137 MMOL/L (ref 136–145)
T WAVE AXIS: 0 DEGREES
T WAVE AXIS: 0 DEGREES
T WAVE AXIS: 103 DEGREES
T WAVE AXIS: 79 DEGREES
T4 SERPL-MCNC: 9.6 UG/DL (ref 4.7–13.3)
TRIGL SERPL-MCNC: 46 MG/DL
TSH SERPL DL<=0.05 MIU/L-ACNC: 3.5 UIU/ML (ref 0.36–3.74)
VENTRICULAR RATE: 0 BPM
VENTRICULAR RATE: 0 BPM
VENTRICULAR RATE: 69 BPM
VENTRICULAR RATE: 77 BPM
WBC # BLD AUTO: 5.84 THOUSAND/UL (ref 4.31–10.16)

## 2020-05-28 PROCEDURE — 84443 ASSAY THYROID STIM HORMONE: CPT | Performed by: PSYCHIATRY & NEUROLOGY

## 2020-05-28 PROCEDURE — 85025 COMPLETE CBC W/AUTO DIFF WBC: CPT | Performed by: PSYCHIATRY & NEUROLOGY

## 2020-05-28 PROCEDURE — 93005 ELECTROCARDIOGRAM TRACING: CPT

## 2020-05-28 PROCEDURE — 80053 COMPREHEN METABOLIC PANEL: CPT | Performed by: PSYCHIATRY & NEUROLOGY

## 2020-05-28 PROCEDURE — 99221 1ST HOSP IP/OBS SF/LOW 40: CPT | Performed by: INTERNAL MEDICINE

## 2020-05-28 PROCEDURE — 93010 ELECTROCARDIOGRAM REPORT: CPT | Performed by: INTERNAL MEDICINE

## 2020-05-28 PROCEDURE — 80061 LIPID PANEL: CPT | Performed by: PSYCHIATRY & NEUROLOGY

## 2020-05-28 PROCEDURE — 84436 ASSAY OF TOTAL THYROXINE: CPT | Performed by: PSYCHIATRY & NEUROLOGY

## 2020-05-28 PROCEDURE — 99221 1ST HOSP IP/OBS SF/LOW 40: CPT | Performed by: PSYCHIATRY & NEUROLOGY

## 2020-05-28 RX ORDER — QUETIAPINE FUMARATE 100 MG/1
100 TABLET, FILM COATED ORAL
Status: DISCONTINUED | OUTPATIENT
Start: 2020-05-28 | End: 2020-06-02 | Stop reason: HOSPADM

## 2020-05-28 RX ORDER — OLANZAPINE 10 MG/1
10 INJECTION, POWDER, LYOPHILIZED, FOR SOLUTION INTRAMUSCULAR EVERY 8 HOURS PRN
Status: DISCONTINUED | OUTPATIENT
Start: 2020-05-28 | End: 2020-06-02 | Stop reason: HOSPADM

## 2020-05-28 RX ORDER — HYDROXYZINE 50 MG/1
50 TABLET, FILM COATED ORAL EVERY 6 HOURS PRN
Status: DISCONTINUED | OUTPATIENT
Start: 2020-05-28 | End: 2020-06-02 | Stop reason: HOSPADM

## 2020-05-28 RX ORDER — LORAZEPAM 1 MG/1
1 TABLET ORAL EVERY 4 HOURS PRN
Status: DISCONTINUED | OUTPATIENT
Start: 2020-05-28 | End: 2020-06-02 | Stop reason: HOSPADM

## 2020-05-28 RX ORDER — ACETAMINOPHEN 325 MG/1
975 TABLET ORAL EVERY 6 HOURS PRN
Status: DISCONTINUED | OUTPATIENT
Start: 2020-05-28 | End: 2020-06-02 | Stop reason: HOSPADM

## 2020-05-28 RX ORDER — OLANZAPINE 10 MG/1
10 TABLET, ORALLY DISINTEGRATING ORAL
Status: DISCONTINUED | OUTPATIENT
Start: 2020-05-28 | End: 2020-06-02 | Stop reason: HOSPADM

## 2020-05-28 RX ORDER — ACETAMINOPHEN 325 MG/1
650 TABLET ORAL EVERY 4 HOURS PRN
Status: DISCONTINUED | OUTPATIENT
Start: 2020-05-28 | End: 2020-06-02 | Stop reason: HOSPADM

## 2020-05-28 RX ADMIN — HYDROXYZINE HYDROCHLORIDE 50 MG: 50 TABLET, FILM COATED ORAL at 18:58

## 2020-05-28 RX ADMIN — QUETIAPINE FUMARATE 100 MG: 100 TABLET ORAL at 21:47

## 2020-05-29 LAB
ATRIAL RATE: 72 BPM
P AXIS: 73 DEGREES
PR INTERVAL: 164 MS
QRS AXIS: 94 DEGREES
QRSD INTERVAL: 90 MS
QT INTERVAL: 402 MS
QTC INTERVAL: 440 MS
T WAVE AXIS: 78 DEGREES
VENTRICULAR RATE: 72 BPM

## 2020-05-29 PROCEDURE — 93010 ELECTROCARDIOGRAM REPORT: CPT | Performed by: INTERNAL MEDICINE

## 2020-05-29 PROCEDURE — 99232 SBSQ HOSP IP/OBS MODERATE 35: CPT | Performed by: PSYCHIATRY & NEUROLOGY

## 2020-05-29 RX ADMIN — QUETIAPINE FUMARATE 100 MG: 100 TABLET ORAL at 21:30

## 2020-05-30 PROCEDURE — 99232 SBSQ HOSP IP/OBS MODERATE 35: CPT | Performed by: PSYCHIATRY & NEUROLOGY

## 2020-05-30 RX ADMIN — LORAZEPAM 1 MG: 1 TABLET ORAL at 19:07

## 2020-05-30 RX ADMIN — QUETIAPINE FUMARATE 100 MG: 100 TABLET ORAL at 21:02

## 2020-05-31 PROCEDURE — 99232 SBSQ HOSP IP/OBS MODERATE 35: CPT | Performed by: PSYCHIATRY & NEUROLOGY

## 2020-05-31 RX ADMIN — QUETIAPINE FUMARATE 100 MG: 100 TABLET ORAL at 21:27

## 2020-05-31 RX ADMIN — LORAZEPAM 1 MG: 1 TABLET ORAL at 14:16

## 2020-05-31 RX ADMIN — HYDROXYZINE HYDROCHLORIDE 50 MG: 50 TABLET, FILM COATED ORAL at 19:47

## 2020-06-01 PROCEDURE — 99232 SBSQ HOSP IP/OBS MODERATE 35: CPT | Performed by: PSYCHIATRY & NEUROLOGY

## 2020-06-01 RX ADMIN — HYDROXYZINE HYDROCHLORIDE 50 MG: 50 TABLET, FILM COATED ORAL at 18:03

## 2020-06-01 RX ADMIN — QUETIAPINE FUMARATE 100 MG: 100 TABLET ORAL at 21:21

## 2020-06-01 RX ADMIN — LORAZEPAM 1 MG: 1 TABLET ORAL at 09:08

## 2020-06-02 VITALS
OXYGEN SATURATION: 96 % | BODY MASS INDEX: 18.58 KG/M2 | WEIGHT: 140.21 LBS | DIASTOLIC BLOOD PRESSURE: 52 MMHG | TEMPERATURE: 98.2 F | SYSTOLIC BLOOD PRESSURE: 92 MMHG | RESPIRATION RATE: 17 BRPM | HEART RATE: 71 BPM | HEIGHT: 73 IN

## 2020-06-02 PROCEDURE — 99239 HOSP IP/OBS DSCHRG MGMT >30: CPT | Performed by: PHYSICIAN ASSISTANT

## 2020-06-02 RX ORDER — HYDROXYZINE 50 MG/1
50 TABLET, FILM COATED ORAL EVERY 8 HOURS PRN
Qty: 30 TABLET | Refills: 1 | Status: SHIPPED | OUTPATIENT
Start: 2020-06-02 | End: 2022-07-08 | Stop reason: ALTCHOICE

## 2020-06-02 RX ORDER — QUETIAPINE FUMARATE 100 MG/1
100 TABLET, FILM COATED ORAL
Qty: 30 TABLET | Refills: 1 | Status: SHIPPED | OUTPATIENT
Start: 2020-06-02 | End: 2022-07-08 | Stop reason: ALTCHOICE

## 2020-06-17 ENCOUNTER — TELEPHONE (OUTPATIENT)
Dept: OTHER | Facility: OTHER | Age: 25
End: 2020-06-17

## 2020-07-08 ENCOUNTER — SOCIAL WORK (OUTPATIENT)
Dept: BEHAVIORAL/MENTAL HEALTH CLINIC | Facility: CLINIC | Age: 25
End: 2020-07-08
Payer: COMMERCIAL

## 2020-07-08 DIAGNOSIS — F15.11: ICD-10-CM

## 2020-07-08 DIAGNOSIS — F12.10 CANNABIS ABUSE: ICD-10-CM

## 2020-07-08 DIAGNOSIS — F43.21 ADJUSTMENT DISORDER WITH DEPRESSED MOOD: Primary | ICD-10-CM

## 2020-07-08 DIAGNOSIS — F40.10 SOCIAL ANXIETY DISORDER: ICD-10-CM

## 2020-07-08 PROCEDURE — 90791 PSYCH DIAGNOSTIC EVALUATION: CPT | Performed by: SOCIAL WORKER

## 2020-07-08 PROCEDURE — 4004F PT TOBACCO SCREEN RCVD TLK: CPT | Performed by: SOCIAL WORKER

## 2020-07-08 NOTE — PSYCH
Treatment  Plan not complete within required time limits due to: Current emotional state, Counseling was provided during the session, and that took most of the office visit, will do at next OV      Assessment/Plan:      Diagnoses and all orders for this visit:    Adjustment disorder with depressed mood    Social anxiety disorder    Cannabis abuse    Amphetamine use disorder, mild, in early remission ([facility] [address] )        R/O victim of emotional abuse    Subjective:      Patient ID: Vianney Solis is a 22 y o  male  HPI:   Presenting Problem: anxiety and depression  When in the hospital, had amphetamines in his system - been using on and off since about 24 yers old - 2016 or so  Anxiety since high school - graduated 2013  Depression as long as he can remember  Prior to hospital - thought he was hearing things - not sure if it was actually happening as his step-father would make him feel crazy all the time    Depression screen: Feelings of sadness everyday or nearly every day? Yes ;   Lack of interest in things that interest you? No;   Problems with sleep (too much or too little) Yes, describe: not with medication (current avg 6-8); Gained or lost 5% of weight in a month (I e  6-10 pounds) without trying? No;   restless or sluggishness Yes ; Fatigue or loss of energy? Yes;   Worthless, shame, or inappropriate guilt? yes;   lack of focus/ problems concentrating? No;   Passive Suicidal ideation? No; Fleeting or Persistent Suicidal Ideation? No; Homicidal Ideation? No    Irritability/ Anger? Yes, describe:  - just feeling irritable    Anxiety Screen:  How bad is your anxiety on average over the last two weeks? 8;  Panic attacks? No;   Anxiety with wide open spaces/ lots of people? No;   Any social anxiety? Yes, describe: with large amounts of people and meeting new ones  ;   Any obsessive/ compulsive thoughts or actions?  No;   (Paranoia) Any irrational thoughts or fears? (e g  Fears that when you look at a second time, you ask why you were worried) No;   Any generalized anxiety (anxious all day, everyday, but no known cause)? Yes, describe: social is primary though    Trauma History:  Any trauma in your background? Maybe during football - several head concussions; Ever witnessed Domestic Violence? No;   Ever experience(d) Domestic Violence? No;   Were abused as a child (physical, sexual, or emotional)? No;   Have you ever been in any serious car accidents? No;   Have you ever had a concussion? Yes, describe: 1 serious (2013), but 2 overall (2nd - 2010); Have you ever witnessed or experienced community violence? No;   Have you ever been held hostage, been threatened with a weapon, or forced to do something against your will? No;   Have you had any losses, deaths, or moves (especially in the last five years)? No    Sense of foreshortened future? Yes ;     Current stressors: substance abuse, family problems (problems with step-dad "gas lighting"), moving, getting out of the psych lacy    Supports: music, tobacco, friends, food    Homework: sanity check, gave him the number for [employer]    Pre-morbid level of function and History of Present Illness: when he was 24, ran into friend, and drug problem started  Previous Psychiatric/psychological treatment/year: prev psychotherapy and psychiatry (during high school - focusing in school - [facility] and [facility]); inpatient 2020 - thought he was hearing things      Current Psychiatrist/Therapist:   Outpatient and/or Partial and Other Community Resources Used ([facility], [facility], [facility]):       Problem Assessment:     SOCIAL/VOCATION:  Family Constellation (include parents, relationship with each and pertinent Psych/Medical History):     Family History   Problem Relation Age of Onset    RACHID disease Mother         Reflux    Other Mother         Rhinitis    No Known Problems Father     Allergies Cousin         Allergic Disorder    Asthma Cousin        Mother:   Spouse:    Father: Children:    Sibling:    Sibling:    Children:    Other:     Maicol Dorantes relates best to oldest brother, Esvin Juilo, or cousin, Deana King  he lives with mother, and step-father  he does not live alone  Domestic Violence: none    Additional Comments related to family/relationships/peer support: has friend  School or Work History (strengths/limitations/needs): Trying to get disability - former work in [employer]  Extreme depression - trying to get disability due to this  Her highest grade level achieved was finished high school   history includes none    Financial status includes     LEISURE ASSESSMENT (Include past and present hobbies/interests and level of involvement (Ex: Group/Club Affiliations): sports (football, basketball, lacrosse)  his primary language is [place]  Preferred language is [place]  Ethnic considerations are -american born in [place]  Religions affiliations and level of involvement none   Does spirituality help you cope? Yes some    FUNCTIONAL STATUS: There has been a recent change in Maicol Dorantes ability to do the following: does not need can service    Level of Assistance Needed/By Whom?: none    Maicol Dorantes learns best by  listening and picture    SUBSTANCE ABUSE ASSESSMENT: current substance abuse  and past substance abuse    Substance/Route/Age/Amount/Frequency/Last Use: amphetamines - started high school (first prescribed, then street version), clean since end of May 2020; Marijuana started high school - still use occasionally      DETOX HISTORY: none    Previous detox/rehab treatment: no rehab    HEALTH ASSESSMENT: no referral to PCP needed    LEGAL: No Mental Health Advance Directive or Power of  on file   No criminal history    Risk Assessment:   The following ratings are based on my interview(s) with Leann    Risk of Harm to Self:   Demographic risk factors include lowest socioeconomic class, never  or  status and male  Historical Risk Factors include history of suicidal behaviors/attempts and substance abuse or dependence  Recent Specific Risk Factors include unable to visualize a realistic positive future, substance abuse, feelings of guilt or self blame, worries about finances or work, chronic pain or health problems and diagnosis of depression   Additional Factors for a Child or Adolescent n/a    Risk of Harm to Others:   Demographic Risk Factors include male, unemployed and [age] years of age  Historical Risk Factors include none  Recent Specific Risk Factors include abusing substances and multiple stressors    Access to Weapons:   Naeem Puckett has access to the following weapons: none   The following steps have been taken to ensure weapons are properly secured: n/a    Based on the above information, the client presents the following risk of harm to self or others:  low    The following interventions are recommended:   no intervention changes    Notes regarding this Risk Assessment: none        Review Of Systems:     Mood Anxiety   Behavior Normal    Thought Content Normal   General Relationship Problems, Emotional Problems and Decreased Functioning   Personality Normal   Other Psych Symptoms Normal   Constitutional As Noted in HPI   ENT As Noted in HPI   Cardiovascular As Noted in HPI   Respiratory As Noted in HPI   Gastrointestinal As Noted in HPI   Genitourinary As Noted in HPI   Musculoskeletal As Noted in HPI   Integumentary As Noted in HPI   Neurological As Noted in HPI   Endocrine Normal          Mental status:  Appearance calm and cooperative , adequate hygiene and grooming and good eye contact    Mood anxious   Affect affect was constricted   Speech a normal rate   Thought Processes coherent/organized and normal thought processes   Hallucinations no hallucinations present    Thought Content no delusions   Abnormal Thoughts no suicidal thoughts  and no homicidal thoughts    Orientation  oriented to person, oriented to place and oriented to time   Remote Memory short term memory intact and long term memory intact   Attention Span concentration intact   Intellect Appears to be of Average Intelligence   Fund of Knowledge displays adequate knowledge of current events, adequate fund of knowledge regarding past history and adequate fund of knowledge regarding vocabulary    Insight Limited insight   Judgement judgment was limited   Muscle Strength Muscle strength and tone were normal and Normal gait    Language no difficulty naming common objects, no difficulty repeating a phrase  and no difficulty writing a sentence    Pain none   Pain Scale 0

## 2020-09-10 ENCOUNTER — DOCUMENTATION (OUTPATIENT)
Dept: BEHAVIORAL/MENTAL HEALTH CLINIC | Facility: CLINIC | Age: 25
End: 2020-09-10

## 2021-04-16 ENCOUNTER — OFFICE VISIT (OUTPATIENT)
Dept: FAMILY MEDICINE CLINIC | Facility: HOSPITAL | Age: 26
End: 2021-04-16

## 2021-04-16 VITALS
BODY MASS INDEX: 19.8 KG/M2 | HEART RATE: 70 BPM | HEIGHT: 73 IN | SYSTOLIC BLOOD PRESSURE: 138 MMHG | WEIGHT: 149.4 LBS | OXYGEN SATURATION: 97 % | TEMPERATURE: 97.2 F | DIASTOLIC BLOOD PRESSURE: 80 MMHG

## 2021-04-16 DIAGNOSIS — F98.8 ADD (ATTENTION DEFICIT DISORDER) WITHOUT HYPERACTIVITY: ICD-10-CM

## 2021-04-16 DIAGNOSIS — Z00.00 ANNUAL PHYSICAL EXAM: Primary | ICD-10-CM

## 2021-04-16 PROCEDURE — 99395 PREV VISIT EST AGE 18-39: CPT | Performed by: FAMILY MEDICINE

## 2021-04-16 NOTE — PROGRESS NOTES
Inga Delaney MD    NAME: Mearl Brittle  AGE: 32 y o  SEX: male  : 1995     DATE: 2021     Assessment and Plan:     Problem List Items Addressed This Visit        Other    ADD (attention deficit disorder) without hyperactivity    Relevant Orders    Ambulatory referral to Neuropsychology      Other Visit Diagnoses     Annual physical exam    -  Primary        He wants to restart Adderall to help him focus at work  I told I would not be able to Rx controlled medication because of last years admission for psychiatric issues  He has not seen Psychiatry since time of discharge  I told him I could Rx Stratera or refer to Neuropsych for confirmation of ADD diagnosis and medication recommendation if appropriate    Immunizations and preventive care screenings were discussed with patient today  Appropriate education was printed on patient's after visit summary  Counseling:  Dental Health: discussed importance of regular tooth brushing, flossing, and dental visits  Injury prevention: discussed safety/seat belts, safety helmets, smoke detectors, carbon dioxide detectors, and smoking near bedding or upholstery  · Exercise: the importance of regular exercise/physical activity was discussed  Recommend exercise 3-5 times per week for at least 30 minutes  Return in about 1 year (around 2022) for Annual physical      Chief Complaint:     Chief Complaint   Patient presents with    Annual Exam      History of Present Illness:     Adult Annual Physical   Patient here for a comprehensive physical exam  The patient reports problems - ADD  Diet and Physical Activity  · Diet/Nutrition: well balanced diet  · Exercise: walking  Depression Screening  PHQ-9 Depression Screening    PHQ-9:   Frequency of the following problems over the past two weeks:           General Health  · Sleep: sleeps well     · Hearing: normal - bilateral   · Vision: no vision problems  · Dental: regular dental visits and brushes teeth twice daily   Health  · History of STDs?: no      Review of Systems:     Review of Systems   Constitutional: Negative for unexpected weight change  HENT: Negative  Eyes: Negative for visual disturbance  Respiratory: Negative  Cardiovascular: Negative  Gastrointestinal: Negative  Genitourinary: Negative  Musculoskeletal: Negative  Skin: Negative  Neurological: Negative  Hematological: Negative  Psychiatric/Behavioral: Positive for decreased concentration  All other systems reviewed and are negative  Past Medical History:     Past Medical History:   Diagnosis Date    ADHD     Allergic     Anxiety     Asthma     Depression     Dysuria     Last assessed - 1/24/14    Suicide attempt [facility])       Past Surgical History:     History reviewed  No pertinent surgical history     Social History:        Social History     Socioeconomic History    Marital status: Single     Spouse name: None    Number of children: None    Years of education: None    Highest education level: None   Occupational History    Occupation: [employer]      Comment: Employed Full Time   Social Needs    Financial resource strain: None    Food insecurity     Worry: None     Inability: None    Transportation needs     Medical: None     Non-medical: None   Tobacco Use    Smoking status: Current Some Day Smoker     Types: Cigarettes    Smokeless tobacco: Never Used    Tobacco comment: Black-a-mile   Substance and Sexual Activity    Alcohol use: Not Currently    Drug use: Yes     Types: Marijuana, Amphetamines, Methamphetamines    Sexual activity: None   Lifestyle    Physical activity     Days per week: None     Minutes per session: None    Stress: None   Relationships    Social connections     Talks on phone: None     Gets together: None     Attends [employer] service: None     Active member of club or organization: None     Attends meetings of clubs or organizations: None     Relationship status: None    Intimate partner violence     Fear of current or ex partner: None     Emotionally abused: None     Physically abused: None     Forced sexual activity: None   Other Topics Concern    None   Social History Narrative    None      Family History:     Family History   Problem Relation Age of Onset    RACHID disease Mother         Reflux    Other Mother         Rhinitis    No Known Problems Father     Allergies Cousin         Allergic Disorder    Asthma Cousin       Current Medications:     Current Outpatient Medications   Medication Sig Dispense Refill    hydrOXYzine HCL (ATARAX) 50 mg tablet Take 1 tablet (50 mg total) by mouth every 8 (eight) hours as needed for anxiety (Patient not taking: Reported on 4/16/2021) 30 tablet 1    QUEtiapine (SEROquel) 100 mg tablet Take 1 tablet (100 mg total) by mouth daily at bedtime (Patient not taking: Reported on 4/16/2021) 30 tablet 1     No current facility-administered medications for this visit  Allergies:     No Known Allergies   Physical Exam:     /80 (BP Location: Left arm, Patient Position: Sitting, Cuff Size: Standard)   Pulse 70   Temp (!) 97 2 °F (36 2 °C) (Tympanic)   Ht 6' 1" (1 854 m)   Wt 67 8 kg (149 lb 6 4 oz)   SpO2 97%   BMI 19 71 kg/m²     Physical Exam  Vitals signs and nursing note reviewed  Constitutional:       Appearance: Normal appearance  HENT:      Head: Normocephalic and atraumatic  Right Ear: Tympanic membrane, ear canal and external ear normal  There is no impacted cerumen  Left Ear: Tympanic membrane, ear canal and external ear normal  There is no impacted cerumen  Nose: Nose normal       Mouth/Throat:      Mouth: Mucous membranes are moist    Eyes:      Pupils: Pupils are equal, round, and reactive to light  Cardiovascular:      Rate and Rhythm: Normal rate and regular rhythm        Pulses: Normal pulses  Heart sounds: Normal heart sounds  Pulmonary:      Effort: Pulmonary effort is normal       Breath sounds: Normal breath sounds  Abdominal:      General: Abdomen is flat  Palpations: Abdomen is soft  Musculoskeletal:      Right lower leg: No edema  Left lower leg: No edema  Skin:     Findings: No rash  Neurological:      General: No focal deficit present  Mental Status: He is alert and oriented to person, place, and time  Psychiatric:         Mood and Affect: Mood normal          Behavior: Behavior normal          Thought Content:  Thought content normal          Judgment: Judgment normal           MD Ariana Terry MD

## 2021-04-16 NOTE — PATIENT INSTRUCTIONS

## 2022-07-08 ENCOUNTER — OFFICE VISIT (OUTPATIENT)
Dept: FAMILY MEDICINE CLINIC | Facility: HOSPITAL | Age: 27
End: 2022-07-08
Payer: COMMERCIAL

## 2022-07-08 VITALS
TEMPERATURE: 98.1 F | OXYGEN SATURATION: 98 % | DIASTOLIC BLOOD PRESSURE: 83 MMHG | HEART RATE: 94 BPM | HEIGHT: 73 IN | WEIGHT: 169 LBS | SYSTOLIC BLOOD PRESSURE: 138 MMHG | BODY MASS INDEX: 22.4 KG/M2

## 2022-07-08 DIAGNOSIS — F51.05 HYPOSOMNIA, INSOMNIA OR SLEEPLESSNESS ASSOCIATED WITH ANXIETY: Primary | ICD-10-CM

## 2022-07-08 DIAGNOSIS — F41.9 HYPOSOMNIA, INSOMNIA OR SLEEPLESSNESS ASSOCIATED WITH ANXIETY: Primary | ICD-10-CM

## 2022-07-08 PROCEDURE — 99213 OFFICE O/P EST LOW 20 MIN: CPT | Performed by: FAMILY MEDICINE

## 2022-07-08 RX ORDER — DOXEPIN HYDROCHLORIDE 10 MG/1
10 CAPSULE ORAL
Qty: 30 CAPSULE | Refills: 1 | Status: SHIPPED | OUTPATIENT
Start: 2022-07-08 | End: 2022-07-25 | Stop reason: SDUPTHER

## 2022-07-08 NOTE — PROGRESS NOTES
Assessment/Plan:         Diagnoses and all orders for this visit:    Hyposomnia, insomnia or sleeplessness associated with anxiety  -     doxepin (SINEquan) 10 mg capsule; Take 1 capsule (10 mg total) by mouth daily at bedtime          Subjective:      Patient ID: Antony Mcfarlane is a 32 y o  male  Visit for anxiety and sleep issues      Trouble falling asleep  Sleeps about 4-5 hours a night  It does affect his work     Was working at [employer] and also [employer]    Xolve and Nyquil with minimal benefit    Had one episode of chest pain, seen in ER 1/2022      The following portions of the patient's history were reviewed and updated as appropriate: allergies, current medications, past family history, past medical history, past social history, past surgical history and problem list     Review of Systems      Objective:      /83 (BP Location: Left arm, Patient Position: Sitting, Cuff Size: Large)   Pulse 94   Temp 98 1 °F (36 7 °C) (Tympanic)   Ht 6' 1" (1 854 m)   Wt 76 7 kg (169 lb)   SpO2 98%   BMI 22 30 kg/m²          Physical Exam

## 2022-07-25 DIAGNOSIS — F51.05 HYPOSOMNIA, INSOMNIA OR SLEEPLESSNESS ASSOCIATED WITH ANXIETY: ICD-10-CM

## 2022-07-25 DIAGNOSIS — F41.9 HYPOSOMNIA, INSOMNIA OR SLEEPLESSNESS ASSOCIATED WITH ANXIETY: ICD-10-CM

## 2022-07-25 RX ORDER — DOXEPIN HYDROCHLORIDE 10 MG/1
10 CAPSULE ORAL
Qty: 30 CAPSULE | Refills: 1 | Status: SHIPPED | OUTPATIENT
Start: 2022-07-25 | End: 2022-08-25 | Stop reason: SDUPTHER

## 2022-07-29 ENCOUNTER — OFFICE VISIT (OUTPATIENT)
Dept: FAMILY MEDICINE CLINIC | Facility: HOSPITAL | Age: 27
End: 2022-07-29
Payer: COMMERCIAL

## 2022-07-29 VITALS
OXYGEN SATURATION: 99 % | HEIGHT: 73 IN | HEART RATE: 75 BPM | SYSTOLIC BLOOD PRESSURE: 124 MMHG | WEIGHT: 173.8 LBS | DIASTOLIC BLOOD PRESSURE: 70 MMHG | BODY MASS INDEX: 23.03 KG/M2

## 2022-07-29 DIAGNOSIS — Z11.3 SCREENING EXAMINATION FOR SEXUALLY TRANSMITTED DISEASE: ICD-10-CM

## 2022-07-29 DIAGNOSIS — F41.9 HYPOSOMNIA, INSOMNIA OR SLEEPLESSNESS ASSOCIATED WITH ANXIETY: ICD-10-CM

## 2022-07-29 DIAGNOSIS — Z00.00 WELL ADULT EXAM: Primary | ICD-10-CM

## 2022-07-29 DIAGNOSIS — Z72.0 TOBACCO USE: ICD-10-CM

## 2022-07-29 DIAGNOSIS — F51.05 HYPOSOMNIA, INSOMNIA OR SLEEPLESSNESS ASSOCIATED WITH ANXIETY: ICD-10-CM

## 2022-07-29 LAB — EXTERNAL HIV SCREEN: NORMAL

## 2022-07-29 PROCEDURE — 99395 PREV VISIT EST AGE 18-39: CPT | Performed by: NURSE PRACTITIONER

## 2022-07-29 NOTE — PROGRESS NOTES
Subjective     Fanuvaldo Villeda is a 32 y o   male and is here for routine health maintenance  History of Present Illness     Vanesa Carlson is here for wellness exam   He reports recent sexual activity with two females and wishes to be tested for STI  He currently denies any symptomology but understands that many STI can be asymptomatic in males  He uses condoms as his form of contraception  He denies any sores, penile/testicular/scrotal swelling pain or discharge  Denies malaise, fever, chills  No myalgias/arthralgias  No acute changes in vision      Well Adult Physical   Patient here for a comprehensive physical exam       Diet and Physical Activity  Diet: well balanced diet  Weight concerns: None, patient's BMI is between 18 5-24 9  Exercise: frequently      Depression Screen  PHQ-2/9 Depression Screening    Little interest or pleasure in doing things: 0 - not at all  Feeling down, depressed, or hopeless: 0 - not at all  Trouble falling or staying asleep, or sleeping too much: 0 - not at all  Feeling tired or having little energy: 0 - not at all  Poor appetite or overeatin - not at all  Feeling bad about yourself - or that you are a failure or have let yourself or your family down: 0 - not at all  Trouble concentrating on things, such as reading the newspaper or watching television: 0 - not at all  Moving or speaking so slowly that other people could have noticed   Or the opposite - being so fidgety or restless that you have been moving around a lot more than usual: 0 - not at all  Thoughts that you would be better off dead, or of hurting yourself in some way: 0 - not at all  PHQ-9 Score: 0   PHQ-9 Interpretation: No or Minimal depression           General Health  Hearing: Normal:  bilateral  Vision: vision problems: sometimes trouble driving at night and most recent eye exam <1 year  Dental: regular dental visits, brushes teeth twice daily and flosses teeth occasionally      Cancer Screening  Colononoscopy N/A  PSA N/A    Smoker currently smokes 2 Black and mild cigars daily  Annual screening with low-dose helical computed tomography (CT) for patients age 54 to 76 years with history of smoking at least 30 pack-years and, if a former smoker, had quit within the previous 15 years      The following portions of the patient's history were reviewed and updated as appropriate: allergies, current medications, past family history, past medical history, past social history, past surgical history and problem list     Review of Systems     Review of Systems   Constitutional: Negative  Negative for activity change, appetite change, chills, fatigue and fever  HENT: Negative  Negative for congestion, ear pain, postnasal drip, sinus pain and trouble swallowing  Eyes: Negative  Negative for pain and visual disturbance  Respiratory: Negative  Negative for cough and shortness of breath  Cardiovascular: Negative  Negative for chest pain and leg swelling  Gastrointestinal: Negative  Negative for constipation and diarrhea  Endocrine: Negative  Genitourinary: Negative  Negative for difficulty urinating, dysuria, penile discharge, penile pain, penile swelling, scrotal swelling and testicular pain  Musculoskeletal: Negative  Negative for arthralgias, gait problem, joint swelling and myalgias  Skin: Negative  Negative for rash and wound  Allergic/Immunologic: Negative  Negative for immunocompromised state  Neurological: Negative  Negative for dizziness, weakness, light-headedness and headaches  Hematological: Negative  Psychiatric/Behavioral: Negative for dysphoric mood and sleep disturbance  The patient is nervous/anxious  Past Medical History     Past Medical History:   Diagnosis Date    ADHD     Allergic     Anxiety     Asthma     Depression     Dysuria     Last assessed - 1/24/14    Suicide attempt [facility])        Past Surgical History     History reviewed   No pertinent surgical history  Social History     Social History     Socioeconomic History    Marital status: Single     Spouse name: None    Number of children: None    Years of education: None    Highest education level: None   Occupational History    Occupation: [employer]      Comment: Employed Full Time   Tobacco Use    Smoking status: Current Some Day Smoker     Packs/day: 0 25     Types: Cigars     Start date: 6/27/2016    Smokeless tobacco: Never Used    Tobacco comment: Black-a-mile   Vaping Use    Vaping Use: Never used   Substance and Sexual Activity    Alcohol use: Yes     Alcohol/week: 2 0 standard drinks     Types: 2 Cans of beer per week    Drug use: Not Currently     Types: Marijuana, Amphetamines, Methamphetamines    Sexual activity: Not Currently     Partners: Female     Birth control/protection: Condom Male   Other Topics Concern    None   Social History Narrative    None     Social Determinants of Health     Financial Resource Strain: Not on file   Food Insecurity: Not on file   Transportation Needs: Not on file   Physical Activity: Not on file   Stress: Not on file   Social Connections: Not on file   Intimate Partner Violence: Not on file   Housing Stability: Not on file       Family History     Family History   Problem Relation Age of Onset    RACHID disease Mother         Reflux    Other Mother         Rhinitis    No Known Problems Father     Allergies Cousin         Allergic Disorder    Asthma Cousin        Current Medications       Current Outpatient Medications:     doxepin (SINEquan) 10 mg capsule, Take 1 capsule (10 mg total) by mouth daily at bedtime, Disp: 30 capsule, Rfl: 1     Allergies     No Known Allergies    Objective     /70   Pulse 75   Ht 6' 1" (1 854 m)   Wt 78 8 kg (173 lb 12 8 oz)   SpO2 99%   BMI 22 93 kg/m²      Physical Exam  Vitals and nursing note reviewed  Constitutional:       Appearance: Normal appearance     HENT:      Head: Normocephalic and atraumatic  Right Ear: Tympanic membrane, ear canal and external ear normal       Left Ear: Tympanic membrane, ear canal and external ear normal       Nose: Nose normal       Mouth/Throat:      Mouth: Mucous membranes are moist       Pharynx: Oropharynx is clear  Eyes:      Extraocular Movements: Extraocular movements intact  Conjunctiva/sclera: Conjunctivae normal       Pupils: Pupils are equal, round, and reactive to light  Cardiovascular:      Rate and Rhythm: Normal rate and regular rhythm  Pulses: Normal pulses  Heart sounds: Normal heart sounds  Comments: S1S2 ectopy  Pulmonary:      Effort: Pulmonary effort is normal       Breath sounds: Normal breath sounds  Abdominal:      General: Bowel sounds are normal       Palpations: Abdomen is soft  Musculoskeletal:         General: Normal range of motion  Cervical back: Normal range of motion and neck supple  Right lower leg: No edema  Left lower leg: No edema  Skin:     General: Skin is warm and dry  Neurological:      General: No focal deficit present  Mental Status: He is alert and oriented to person, place, and time  Psychiatric:         Mood and Affect: Mood normal          Behavior: Behavior normal          Thought Content:  Thought content normal          Judgment: Judgment normal            No exam data present    Health Maintenance     Health Maintenance   Topic Date Due    Hepatitis C Screening  Never done    COVID-19 Vaccine (1) Never done    Hepatitis A Vaccine (1 of 2 - Risk 2-dose series) Never done    Pneumococcal Vaccine: Pediatrics (0 to 5 Years) and At-Risk Patients (6 to 59 Years) (1 - PCV) Never done    HIV Screening  Never done    DTaP,Tdap,and Td Vaccines (7 - Td or Tdap) 08/06/2017    Influenza Vaccine (1) 09/01/2022    BMI: Adult  07/29/2023    Annual Physical  07/29/2023    HIB Vaccine  Completed    Hepatitis B Vaccine  Completed    IPV Vaccine  Completed    Meningococcal ACWY Vaccine  Aged Out    HPV Vaccine  Aged Out     Immunization History   Administered Date(s) Administered    DTaP 5 1995, 1995, 1995, 06/28/1996, 06/03/1999    Hep B, adult 1995, 1995, 06/28/1996    Hib (PRP-OMP) 1995, 1995, 1995, 06/01/1999    IPV 1995, 1995, 1995, 06/28/1996, 06/03/1999    MMR 06/28/1996, 06/03/1999    Meningococcal, Unknown Serogroups 07/24/2007, 06/12/2013    Tdap 08/06/2007       Laboratory Results:   Lab Results   Component Value Date    WBC 5 84 05/28/2020    HGB 13 5 05/28/2020    HCT 38 6 05/28/2020    MCV 85 05/28/2020     05/28/2020     Lab Results   Component Value Date    BUN 8 05/28/2020     Lab Results   Component Value Date    GLUC 74 05/28/2020    ALT 29 05/28/2020    AST 29 05/28/2020     No results found for: TSH  No results found for: HGBA1C    Lipid Profile:   No results found for: CHOL  Lab Results   Component Value Date    HDL 51 05/28/2020     No results found for: LDLC  Lab Results   Component Value Date    LDLCALC 103 (H) 05/28/2020     Lab Results   Component Value Date    TRIG 46 05/28/2020       Assessment/Plan       1  Healthy male exam   2  Patient Counseling:   · Nutrition: Stressed importance of a well balanced diet, moderation of sodium/saturated fat, caloric balance and sufficient intake of fiber  · Exercise: Stressed the importance of regular exercise with a goal of 150 minutes per week  · Dental Health: Discussed daily flossing and brushing and regular dental visits   · Sexuality: Discussed sexually transmitted infections, use of condoms and prevention of unintended pregnancy  · Immunizations reviewed  · Discussed benefits of screening  · Discussed the patient's BMI with him  The BMI is in the acceptable range  3  Cancer Screening   4  Labs reveiwed  5  Follow up as needed for acute illness      TANI Ritter

## 2022-08-01 ENCOUNTER — TELEPHONE (OUTPATIENT)
Dept: FAMILY MEDICINE CLINIC | Facility: HOSPITAL | Age: 27
End: 2022-08-01

## 2022-08-04 ENCOUNTER — TELEPHONE (OUTPATIENT)
Dept: FAMILY MEDICINE CLINIC | Facility: HOSPITAL | Age: 27
End: 2022-08-04

## 2022-08-04 NOTE — TELEPHONE ENCOUNTER
Pt returned call- Labs were done at [facility], I contacted them and they will fax results   CR

## 2022-08-04 NOTE — TELEPHONE ENCOUNTER
Results not received yet  "[employer] has no record of pt  Lm for pt to cb   What lab did he go to?

## 2022-08-09 ENCOUNTER — TELEPHONE (OUTPATIENT)
Dept: FAMILY MEDICINE CLINIC | Facility: HOSPITAL | Age: 27
End: 2022-08-09

## 2022-08-09 NOTE — TELEPHONE ENCOUNTER
Pt requesting note to be excused from jury duty due to anxiety and depression  Pt aware Dr Markus Rodriguez is out until next week       #[id number]

## 2022-08-30 ENCOUNTER — TELEPHONE (OUTPATIENT)
Dept: FAMILY MEDICINE CLINIC | Facility: HOSPITAL | Age: 27
End: 2022-08-30

## 2022-10-07 ENCOUNTER — OFFICE VISIT (OUTPATIENT)
Dept: URGENT CARE | Age: 27
End: 2022-10-07
Payer: COMMERCIAL

## 2022-10-07 VITALS
HEART RATE: 88 BPM | DIASTOLIC BLOOD PRESSURE: 72 MMHG | OXYGEN SATURATION: 99 % | TEMPERATURE: 97.2 F | SYSTOLIC BLOOD PRESSURE: 122 MMHG | RESPIRATION RATE: 16 BRPM

## 2022-10-07 DIAGNOSIS — B34.9 VIRAL SYNDROME: Primary | ICD-10-CM

## 2022-10-07 LAB
SARS-COV-2 AG UPPER RESP QL IA: NEGATIVE
VALID CONTROL: NORMAL

## 2022-10-07 PROCEDURE — G0382 LEV 3 HOSP TYPE B ED VISIT: HCPCS

## 2022-10-07 PROCEDURE — 87811 SARS-COV-2 COVID19 W/OPTIC: CPT

## 2022-10-07 PROCEDURE — 99283 EMERGENCY DEPT VISIT LOW MDM: CPT

## 2022-10-07 NOTE — PATIENT INSTRUCTIONS
Rapid point of care COVID testing negative  Continue with supportive measures, OTC Tylenol/Ibuprofen, cough suppressants, increased fluid intake and rest   Advance diet as tolerated  Follow up with PCP in 3-5 days  Present to ER if symptoms worsen     Viral Syndrome   AMBULATORY CARE:   Viral syndrome  is a term used for symptoms of an infection caused by a virus  Viruses are spread easily from person to person through the air and on shared items  Signs and symptoms  may start slowly or suddenly and last hours to days  They can be mild to severe and can change over days or hours  You may have any of the following:  Fever and chills    A runny or stuffy nose    Cough, sore throat, or hoarseness    Headache, or pain and pressure around your eyes    Muscle aches and joint pain    Shortness of breath or wheezing    Abdominal pain, cramps, and diarrhea    Nausea, vomiting, or loss of appetite    Call your local emergency number (911 in the [address] [facility]) or have someone else call if:   You have a seizure  You cannot be woken  You have chest pain or trouble breathing  Seek care immediately if:   You have a stiff neck, a bad headache, and sensitivity to light  You feel weak, dizzy, or confused  You stop urinating or urinate a lot less than usual     You cough up blood or thick yellow or green mucus  You have severe abdominal pain or your abdomen is larger than usual     Call your doctor if:   Your symptoms do not get better with treatment or get worse after 3 days  You have a rash or ear pain  You have burning when you urinate  You have questions or concerns about your condition or care  Treatment for viral syndrome  may include medicines to manage your symptoms  Antibiotics are not given for a viral infection  You may  need any of the following:  Acetaminophen  decreases pain and fever  It is available without a doctor's order  Ask how much to take and how often to take it  Follow directions   Read the labels of all other medicines you are using to see if they also contain acetaminophen, or ask your doctor or pharmacist  Acetaminophen can cause liver damage if not taken correctly  Do not use more than 4 grams (4,000 milligrams) total of acetaminophen in one day  NSAIDs , such as ibuprofen, help decrease swelling, pain, and fever  NSAIDs can cause stomach bleeding or kidney problems in certain people  If you take blood thinner medicine, always ask your healthcare provider if NSAIDs are safe for you  Always read the medicine label and follow directions  Cold medicine  helps decrease swelling, control a cough, and relieve chest or nasal congestion  Saline nasal spray  helps decrease nasal congestion  Manage your symptoms:   Drink liquids as directed to prevent dehydration  Ask how much liquid to drink each day and which liquids are best for you  Ask if you should drink an oral rehydration solution (ORS)  An ORS has the right amounts of water, salts, and sugar you need to replace body fluids  This may help prevent dehydration caused by vomiting or diarrhea  Do not drink liquids with caffeine  Liquids with caffeine can make dehydration worse  Get plenty of rest to help your body heal   Take naps throughout the day  Ask your healthcare provider when you can return to work and your normal activities  Use a cool mist humidifier to help you breathe easier  Ask your healthcare provider how to use a cool mist humidifier  Eat honey or use cough drops for a sore throat  Cough drops are available without a doctor's order  Follow directions for taking cough drops  Do not smoke or be close to anyone who is smoking  Nicotine and other chemicals in cigarettes and cigars can cause lung damage  Smoking can also delay healing  Ask your healthcare provider for information if you currently smoke and need help to quit  E-cigarettes or smokeless tobacco still contain nicotine   Talk to your healthcare provider before you use these products  Prevent the spread of germs:       Wash your hands often  Wash your hands several times each day  Wash after you use the bathroom, change a child's diaper, and before you prepare or eat food  Use soap and water every time  Rub your soapy hands together, lacing your fingers  Wash the front and back of your hands, and in between your fingers  Use the fingers of one hand to scrub under the fingernails of the other hand  Wash for at least 20 seconds  Rinse with warm, running water for several seconds  Then dry your hands with a clean towel or paper towel  Use hand  that contains alcohol if soap and water are not available  Do not touch your eyes, nose, or mouth without washing your hands first          Cover a sneeze or cough  Use a tissue that covers your mouth and nose  Throw the tissue away in a trash can right away  Use the bend of your arm if a tissue is not available  Wash your hands well with soap and water or use a hand   Stay away from others while you are sick  Avoid crowds as much as possible  Ask about vaccines you may need  Talk to your healthcare provider about your vaccine history  He or she will tell you which vaccines you need, and when to get them  Get the influenza (flu) vaccine as soon as recommended each year  The flu vaccine is available starting in September or October  Flu viruses change, so it is important to get a flu vaccine every year  Get the pneumonia vaccine if recommended  This vaccine is usually recommended every 5 years  Your provider will tell you when to get this vaccine, if needed  Follow up with your doctor as directed:  Write down your questions so you remember to ask them during your visits  © Copyright [address] 2022 Information is for End User's use only and may not be sold, redistributed or otherwise used for commercial purposes   All illustrations and images included in CareNotes® are the copyrighted property of A D A M , Inc  or [facility] Leonardo Arnold   The above information is an  only  It is not intended as medical advice for individual conditions or treatments  Talk to your doctor, nurse or pharmacist before following any medical regimen to see if it is safe and effective for you

## 2022-10-07 NOTE — LETTER
October 7, 2022     Patient: Paige Estrella   YOB: 1995   Date of Visit: 10/7/2022       To Whom it May Concern:    Paige Estrella was seen in my clinic on 10/7/2022  He may return to work on 10/8/2022  Please excuse any missed time due to illness  If you have any questions or concerns, please don't hesitate to call           Sincerely,          TANI Hirsch        CC: No Recipients

## 2022-12-30 DIAGNOSIS — F51.05 HYPOSOMNIA, INSOMNIA OR SLEEPLESSNESS ASSOCIATED WITH ANXIETY: ICD-10-CM

## 2022-12-30 DIAGNOSIS — F41.9 HYPOSOMNIA, INSOMNIA OR SLEEPLESSNESS ASSOCIATED WITH ANXIETY: ICD-10-CM

## 2022-12-31 RX ORDER — DOXEPIN HYDROCHLORIDE 10 MG/1
CAPSULE ORAL
Qty: 30 CAPSULE | Refills: 2 | Status: SHIPPED | OUTPATIENT
Start: 2022-12-31

## 2023-03-27 DIAGNOSIS — F51.05 HYPOSOMNIA, INSOMNIA OR SLEEPLESSNESS ASSOCIATED WITH ANXIETY: ICD-10-CM

## 2023-03-27 DIAGNOSIS — F41.9 HYPOSOMNIA, INSOMNIA OR SLEEPLESSNESS ASSOCIATED WITH ANXIETY: ICD-10-CM

## 2023-03-27 RX ORDER — DOXEPIN HYDROCHLORIDE 10 MG/1
CAPSULE ORAL
Qty: 30 CAPSULE | Refills: 2 | Status: SHIPPED | OUTPATIENT
Start: 2023-03-27

## 2023-07-02 DIAGNOSIS — F51.05 HYPOSOMNIA, INSOMNIA OR SLEEPLESSNESS ASSOCIATED WITH ANXIETY: ICD-10-CM

## 2023-07-02 DIAGNOSIS — F41.9 HYPOSOMNIA, INSOMNIA OR SLEEPLESSNESS ASSOCIATED WITH ANXIETY: ICD-10-CM

## 2023-07-03 RX ORDER — DOXEPIN HYDROCHLORIDE 10 MG/1
CAPSULE ORAL
Qty: 30 CAPSULE | Refills: 2 | Status: SHIPPED | OUTPATIENT
Start: 2023-07-03

## 2023-09-29 DIAGNOSIS — F41.9 HYPOSOMNIA, INSOMNIA OR SLEEPLESSNESS ASSOCIATED WITH ANXIETY: ICD-10-CM

## 2023-09-29 DIAGNOSIS — F51.05 HYPOSOMNIA, INSOMNIA OR SLEEPLESSNESS ASSOCIATED WITH ANXIETY: ICD-10-CM

## 2023-09-29 RX ORDER — DOXEPIN HYDROCHLORIDE 10 MG/1
CAPSULE ORAL
Qty: 30 CAPSULE | Refills: 2 | Status: SHIPPED | OUTPATIENT
Start: 2023-09-29

## 2023-12-23 DIAGNOSIS — F41.9 HYPOSOMNIA, INSOMNIA OR SLEEPLESSNESS ASSOCIATED WITH ANXIETY: ICD-10-CM

## 2023-12-23 DIAGNOSIS — F51.05 HYPOSOMNIA, INSOMNIA OR SLEEPLESSNESS ASSOCIATED WITH ANXIETY: ICD-10-CM

## 2023-12-24 RX ORDER — DOXEPIN HYDROCHLORIDE 10 MG/1
CAPSULE ORAL
Qty: 30 CAPSULE | Refills: 2 | Status: SHIPPED | OUTPATIENT
Start: 2023-12-24

## 2024-08-15 ENCOUNTER — OFFICE VISIT (OUTPATIENT)
Dept: DENTISTRY | Facility: CLINIC | Age: 29
End: 2024-08-15

## 2024-08-15 VITALS — TEMPERATURE: 98.6 F | DIASTOLIC BLOOD PRESSURE: 98 MMHG | HEART RATE: 61 BPM | SYSTOLIC BLOOD PRESSURE: 147 MMHG

## 2024-08-15 DIAGNOSIS — Z01.20 ENCOUNTER FOR DENTAL EXAMINATION: Primary | ICD-10-CM

## 2024-08-15 DIAGNOSIS — K04.5 SYMPTOMATIC PERIAPICAL PERIODONTITIS: ICD-10-CM

## 2024-08-15 DIAGNOSIS — K08.9 EXTRACTION OF TOOTH NEEDED: ICD-10-CM

## 2024-08-15 PROCEDURE — D0330 PANORAMIC RADIOGRAPHIC IMAGE: HCPCS

## 2024-08-15 PROCEDURE — D0210 INTRAORAL - COMPLETE SERIES OF RADIOGRAPHIC IMAGES: HCPCS

## 2024-08-15 PROCEDURE — D0150 COMPREHENSIVE ORAL EVALUATION - NEW OR ESTABLISHED PATIENT: HCPCS | Performed by: DENTIST

## 2024-08-15 RX ORDER — AMOXICILLIN 500 MG/1
500 CAPSULE ORAL EVERY 8 HOURS SCHEDULED
Qty: 21 CAPSULE | Refills: 0 | Status: SHIPPED | OUTPATIENT
Start: 2024-08-15 | End: 2024-08-22

## 2024-08-15 NOTE — DENTAL PROCEDURE DETAILS
"   COMP EXAM, FMX, pano PROBE EXAM   Dr requested both sets of x rays due to extensive amount of decay and broken teeth   REVIEWED MED HX: meds, allergies, health changes reviewed in EPIC  CHIEF CONCERN:     -Last dental visit was over 1 year ago  states he had RCTs done a year ago but enamel justs \"breaks down\"  PAIN SCALE:  4  ASA CLASS:  ASA 2 - Patient with mild systemic disease with no functional limitations  PLAQUE:  mild  CALCULUS: Light  BLEEDING:  light  STAIN :  Light  PERIO:  stage 1 grade A      Visual and Tactile Intraoral/ Extraoral evaluation: Oral and Oropharyngeal cancer evaluation.   Multiple non restorable teeth/ root tips    Dr. SANTANA -  Reviewed with patient clinical and radiographic findings and patient verbalized understanding. All questions and concerns addressed.     REFERRALS:  Dr SANTANA felt patient would be best served at [facility] due to need for extensive dental work     Also referred to [facility] for extractions of # 1,2,3,4,5,13,16,30,32  gave copy of pano  ANTIBIOTICS prescribed for dental infections/pain    CARIES FINDINGS:   extensive decay noted  will need build ups/CRS max anterior teeth # 6-11       NEXT VISIT:   1)  [place] plan consult at [facility] DR FLEMING and a resident if approved     Initial prophy- preferably after extractions of non restorable teeth-- can be done at [facility] or [facility]    Last FMX & PAN :  8/15/2024  "

## 2024-08-22 ENCOUNTER — TELEPHONE (OUTPATIENT)
Dept: DENTISTRY | Facility: CLINIC | Age: 29
End: 2024-08-22

## 2024-11-05 ENCOUNTER — HOSPITAL ENCOUNTER (EMERGENCY)
Facility: HOSPITAL | Age: 29
Discharge: HOME | End: 2024-11-05
Attending: EMERGENCY MEDICINE

## 2024-11-05 VITALS
RESPIRATION RATE: 16 BRPM | OXYGEN SATURATION: 99 % | SYSTOLIC BLOOD PRESSURE: 169 MMHG | TEMPERATURE: 98.1 F | DIASTOLIC BLOOD PRESSURE: 99 MMHG | HEART RATE: 89 BPM

## 2024-11-05 DIAGNOSIS — R44.0 AUDITORY HALLUCINATIONS: ICD-10-CM

## 2024-11-05 DIAGNOSIS — F41.9 ANXIETY: Primary | ICD-10-CM

## 2024-11-05 DIAGNOSIS — R44.3 HALLUCINATIONS: ICD-10-CM

## 2024-11-05 PROCEDURE — 99283 EMERGENCY DEPT VISIT LOW MDM: CPT

## 2024-11-05 PROCEDURE — 99284 EMERGENCY DEPT VISIT MOD MDM: CPT | Performed by: PHYSICIAN ASSISTANT

## 2024-11-05 NOTE — ED PROVIDER NOTES
Time reflects when diagnosis was documented in both MDM as applicable and the Disposition within this note       Time User Action Codes Description Comment    11/5/2024  2:53 PM Bijal Robert Add [F41.9] Anxiety     11/5/2024  2:53 PM Bijal Robert Add [R44.0] Auditory hallucinations     11/5/2024  2:58 PM Linda Farnsworth Add [R44.3] Hallucinations           ED Disposition       ED Disposition   Discharge    Condition   Stable    Date/Time   Tue Nov 5, 2024  2:58 PM    Comment   Leann Mcnair discharge to home/self care.                   Assessment & Plan       Medical Decision Making  29y.o male presents to the ER for psychiatric evaluation. Patient is mildly tachycardic. Otherwise vitals are stable. On exam, breathing is non-labored. No tachypnea or accessory muscle use. Abdomen is not distended. Patient reports AH and VH. He is calm and cooperative. He denies SI or HI. Will have Crisis see patient.    1445 - Patient seen by Crisis. Patient is not meeting inpatient criteria. Crisis will place referral for Partial Program.    The management plan was discussed in detail with the patient at bedside and all questions were answered.  Prior to discharge, we provided both verbal and written instructions.  We discussed with the patient the signs and symptoms for which to return to the emergency department.  All questions were answered and patient was comfortable with the plan of care and discharged to home.  Instructed the patient to follow up with the primary care provider and/or specialist provided and their written instructions.  The patient verbalized understanding of our discussion and plan of care, and agrees to return to the Emergency Department for concerns and progression of illness.    At discharge, I instructed the patient to:  -follow up with pcp  -follow up with Crisis referrals  -return to the ER if symptoms worsened or new symptoms arose  Patient agreed to this plan and was stable at time of  discharge.     Problems Addressed:  Hallucinations: acute illness or injury    Amount and/or Complexity of Data Reviewed  Independent Historian:      Details: Patient is historian             Medications - No data to display    ED Risk Strat Scores                           SBIRT 20yo+      Flowsheet Row Most Recent Value   Initial Alcohol Screen: US AUDIT-C     1. How often do you have a drink containing alcohol? 2 Filed at: 11/05/2024 1302   2. How many drinks containing alcohol do you have on a typical day you are drinking?  1 Filed at: 11/05/2024 1302   3a. Male UNDER 65: How often do you have five or more drinks on one occasion? 0 Filed at: 11/05/2024 1302   Audit-C Score 3 Filed at: 11/05/2024 1302   EDUARDO: How many times in the past year have you...    Used an illegal drug or used a prescription medication for non-medical reasons? Never Filed at: 11/05/2024 1302                            History of Present Illness       Chief Complaint   Patient presents with    Psychiatric Evaluation     Patient states he has a Hx of mental health issues since 2021, he was previously put on Atarax and seraqul but has not been able to refill prescriptions due to issues with insurance. He endorses having AH/VH currently, denies any SI/HI.       Past Medical History:   Diagnosis Date    ADHD     Allergic     Anxiety     Asthma     Depression     Dysuria     Last assessed - 1/24/14    Suicide attempt (HCC)       History reviewed. No pertinent surgical history.   Family History   Problem Relation Age of Onset    RACHID disease Mother         Reflux    Other Mother         Rhinitis    No Known Problems Father     Allergies Cousin         Allergic Disorder    Asthma Cousin       Social History     Tobacco Use    Smoking status: Every Day     Current packs/day: 0.25     Average packs/day: 0.3 packs/day for 8.4 years (2.1 ttl pk-yrs)     Types: Cigarettes, Cigars     Start date: 6/27/2016    Smokeless tobacco: Never    [facility]   Vaping Use    Vaping status: Never Used   Substance Use Topics    Alcohol use: Yes     Alcohol/week: 2.0 standard drinks of alcohol     Types: 2 Cans of beer per week    Drug use: Not Currently     Types: Marijuana, Amphetamines, Methamphetamines      E-Cigarette/Vaping    E-Cigarette Use Never User       E-Cigarette/Vaping Substances      I have reviewed and agree with the history as documented.     29y.o male with PMH of ADHD, anxiety, asthma, depression and suicide attempt presents to the ER for psychiatric evaluation. Patient reports AH that he describes as voices just talking. He also reports VH that he reports he is seeing people. He reports this has been ongoing for months. He denies SI or HI. He does not see a psychiatrist or therapist. He has in the past but not recently. He is currently on the waiting list for psychiatry. He also reports he is not taking any medications currently. He has been on Atarax and Seroquel in the past. He lives with his mother. He does not work. He reports alcohol use, stating he occasionally drinks beer. He also smokes cigars. He denies drug use. He reports not sleeping well due to having too much energy. He reports eating well. He denies other complaints.      History provided by:  Patient   used: No        Review of Systems   Constitutional:  Negative for activity change, appetite change, chills and fever.   HENT:  Negative for congestion, drooling, ear discharge, ear pain, facial swelling, rhinorrhea and sore throat.    Eyes:  Negative for redness.   Respiratory:  Negative for cough and shortness of breath.    Cardiovascular:  Negative for chest pain.   Gastrointestinal:  Negative for abdominal pain, diarrhea, nausea and vomiting.   Musculoskeletal:  Negative for neck stiffness.   Skin:  Negative for rash.   Allergic/Immunologic: Negative for food allergies.   Neurological:  Negative for weakness and numbness.   Psychiatric/Behavioral:   Positive for hallucinations. Negative for self-injury and suicidal ideas.            Objective       ED Triage Vitals   Temperature Pulse Blood Pressure Respirations SpO2 Patient Position - Orthostatic VS   11/05/24 1306 11/05/24 1304 11/05/24 1304 11/05/24 1304 11/05/24 1304 11/05/24 1304   98.1 °F (36.7 °C) (!) 109 169/99 16 100 % Lying      Temp src Heart Rate Source BP Location FiO2 (%) Pain Score    -- 11/05/24 1304 11/05/24 1304 -- 11/05/24 1304     Monitor Right arm  No Pain      Vitals      Date and Time Temp Pulse SpO2 Resp BP Pain Score FACES Pain Rating User   11/05/24 1500 -- 89 99 % -- -- -- -- KB   11/05/24 1447 -- 103 -- -- -- -- -- AAR   11/05/24 1306 98.1 °F (36.7 °C) -- -- -- -- -- -- KB   11/05/24 1304 -- 109 100 % 16 169/99 No Pain -- KB            Physical Exam  Vitals and nursing note reviewed.   Constitutional:       General: He is not in acute distress.     Appearance: He is not toxic-appearing.   HENT:      Head: Normocephalic and atraumatic.      Mouth/Throat:      Lips: Pink. No lesions.      Mouth: Mucous membranes are moist.   Eyes:      Conjunctiva/sclera: Conjunctivae normal.   Neck:      Trachea: No tracheal deviation.   Cardiovascular:      Rate and Rhythm: Tachycardia present.   Pulmonary:      Effort: Pulmonary effort is normal. No respiratory distress.   Abdominal:      General: There is no distension.   Musculoskeletal:      Cervical back: Normal range of motion and neck supple.   Skin:     General: Skin is warm and dry.      Findings: No rash.   Neurological:      Mental Status: He is alert.      GCS: GCS eye subscore is 4. GCS verbal subscore is 5. GCS motor subscore is 6.   Psychiatric:         Attention and Perception: He perceives auditory and visual hallucinations.         Mood and Affect: Mood normal.         Speech: Speech normal.         Behavior: Behavior is cooperative.         Thought Content: Thought content does not include homicidal or suicidal ideation.          Results Reviewed       None            No orders to display       Procedures    ED Medication and Procedure Management   Prior to Admission Medications   Prescriptions Last Dose Informant Patient Reported? Taking?   doxepin (SINEquan) 10 mg capsule   No No   Sig: take 1 capsule by mouth at bedtime   Patient not taking: Reported on 8/15/2024      Facility-Administered Medications: None     Discharge Medication List as of 11/5/2024  2:59 PM        CONTINUE these medications which have NOT CHANGED    Details   doxepin (SINEquan) 10 mg capsule take 1 capsule by mouth at bedtime, Normal             ED SEPSIS DOCUMENTATION   Time reflects when diagnosis was documented in both MDM as applicable and the Disposition within this note       Time User Action Codes Description Comment    11/5/2024  2:53 PM Bijal Robert [F41.9] Anxiety     11/5/2024  2:53 PM Bijal Robert Add [R44.0] Auditory hallucinations     11/5/2024  2:58 PM Linda Farnsworth Add [R44.3] Hallucinations                  Linda Farnsworth PA-C  11/05/24 1514

## 2024-11-05 NOTE — ED NOTES
Patient is a 29 year old male presenting to the emergency department with auditory hallucinations. He says he has been hearing voices for years and is unable to determine what they are saying. He says he was on medications before but hasnt been on anything recently and wants to get started back on them. He denies suicidal and homicidal ideations and feels safe at home. Patient says his sleep has been affected due to his anxiety. He denies other complaints and is interested in attending the partial program at [facility] to pursue medication management. He is on the wait list for the [facility] but hasnt heard anything from them and doesnt know how long the wait will be. Patient will be referred to [facility] and will return to the emergency department if symptoms worsen.

## 2024-11-05 NOTE — ED NOTES
[facility] REFERRAL     [facility]    Name and Date of Birth:  Leann Cloud y.o. 1995    Date of Referral: November 5, 2024    Referral Source (Agency/Name): [facility] Emergency Department    Correct Demographics on file:  Yes     Emergency contact on file: Yes     Insurance on file: Yes     _____________________________________________      Presenting Symptoms and Stressors:      Please describe the reason for Referral:   Auditory hallucinations    Stressors: ongoing anxiety    Symptoms:  depression and worsening anxiety    Suicidal Ideation: None    Homicidal Ideation: None    Depressed Mood: Yes, depressed mood    Judy/Hypomania: None    Psychosis: None    Agitation: No    Appetite Changes: normal appetite    Sleep Disturbance: disrupted sleep    Access to Weapons:  No    Smoking Status: denies use    Substance Use:  none    Current Psychiatrist or Therapist:    Psychiatrist: None  Therapist: None    Past Psychiatric Treatment:       If currently Inpatient - Date of Anticipated discharge:       Diagnoses:  Major Depressive Disorder, single, moderate    Do they Require Ambulatory Assistance: No    Communication Assistance: not required     Legal Issues: None        Bijal Robert

## 2024-11-05 NOTE — DISCHARGE INSTRUCTIONS
This writer discussed the patients current presentation and recommended discharge plan with Dr. Polk    They agree with the patient being discharged at this time with referrals and/or information about: [facility]- [phone] & [facility] and outpatient providers for therapy and psychiatry.      The patient was Instructed to follow up with: [facility]- [phone] & [facility] [phone]       This writer and the patient completed a safety plan.  The patient was provided with a copy of their safety plan with encouragement to utilize the plan following discharge.               SAFETY PLAN  Warning Signs (thoughts, images, mood, behavior, situations) increased anxiety and depression, thoughts of suicide        Coping Skills (what can I do to take my mind off the problem, or to keep myself safe): Follow with [facility], call a family member or friend, take a walk or journal your feelings/emotions                   National Suicide Prevention Hotline:  1-469.114.6465     [facility] [phone] - Crisis   [facility] [phone] - LVF Crisis/Mobile Crisis   [phone] - [facility] Crisis   [facility]: [phone]  [facility]: [phone]   [facility] [place] [phone] - Crisis   [facility] [phone] - Crisis      [facility] [phone] - Crisis   [facility] [phone] - Crisis   [phone] - Peer Support Talk Line (1-9pm daily)  [phone] - Teen Support Talk Line (1-9pm daily)  [phone] - [facility] [phone]- Crisis    [facility] [phone] - Crisis   [facility] [phone] - Crisis    [facility]) [phone] - [facility] [place] [facility]

## 2024-11-06 ENCOUNTER — TELEPHONE (OUTPATIENT)
Dept: PSYCHOLOGY | Facility: CLINIC | Age: 29
End: 2024-11-06

## 2024-11-06 NOTE — TELEPHONE ENCOUNTER
Pt just returned my call regarding PHP referral and stated he just recently applied for [place] and will call us back to schedule PHP after the insurance gets active.

## 2025-03-21 ENCOUNTER — APPOINTMENT (EMERGENCY)
Dept: CT IMAGING | Facility: HOSPITAL | Age: 30
End: 2025-03-21
Payer: MEDICARE

## 2025-03-21 ENCOUNTER — APPOINTMENT (EMERGENCY)
Dept: RADIOLOGY | Facility: HOSPITAL | Age: 30
End: 2025-03-21
Payer: MEDICARE

## 2025-03-21 ENCOUNTER — HOSPITAL ENCOUNTER (EMERGENCY)
Facility: HOSPITAL | Age: 30
Discharge: HOME | End: 2025-03-21
Attending: INTERNAL MEDICINE
Payer: MEDICARE

## 2025-03-21 VITALS
OXYGEN SATURATION: 100 % | SYSTOLIC BLOOD PRESSURE: 167 MMHG | TEMPERATURE: 98.2 F | RESPIRATION RATE: 17 BRPM | HEART RATE: 61 BPM | DIASTOLIC BLOOD PRESSURE: 95 MMHG

## 2025-03-21 DIAGNOSIS — R11.2 NAUSEA AND VOMITING: Primary | ICD-10-CM

## 2025-03-21 DIAGNOSIS — R10.9 RIGHT SIDED ABDOMINAL PAIN: ICD-10-CM

## 2025-03-21 LAB
ALBUMIN SERPL BCG-MCNC: 4.7 G/DL (ref 3.5–5)
ALP SERPL-CCNC: 46 U/L (ref 34–104)
ALT SERPL W P-5'-P-CCNC: 21 U/L (ref 7–52)
ANION GAP SERPL CALCULATED.3IONS-SCNC: 5 MMOL/L (ref 4–13)
AST SERPL W P-5'-P-CCNC: 25 U/L (ref 13–39)
BASOPHILS # BLD MANUAL: 0 THOUSAND/UL (ref 0–0.1)
BASOPHILS NFR MAR MANUAL: 0 % (ref 0–1)
BILIRUB SERPL-MCNC: 0.51 MG/DL (ref 0.2–1)
BUN SERPL-MCNC: 9 MG/DL (ref 5–25)
CALCIUM SERPL-MCNC: 9.5 MG/DL (ref 8.4–10.2)
CARDIAC TROPONIN I PNL SERPL HS: <2 NG/L (ref ?–50)
CARDIAC TROPONIN I PNL SERPL HS: <2 NG/L (ref ?–50)
CHLORIDE SERPL-SCNC: 102 MMOL/L (ref 96–108)
CO2 SERPL-SCNC: 27 MMOL/L (ref 21–32)
CREAT SERPL-MCNC: 0.97 MG/DL (ref 0.6–1.3)
EOSINOPHIL # BLD MANUAL: 0 THOUSAND/UL (ref 0–0.4)
EOSINOPHIL NFR BLD MANUAL: 0 % (ref 0–6)
ERYTHROCYTE [DISTWIDTH] IN BLOOD BY AUTOMATED COUNT: 12.2 % (ref 11.6–15.1)
FLUAV AG UPPER RESP QL IA.RAPID: NEGATIVE
FLUBV AG UPPER RESP QL IA.RAPID: NEGATIVE
GFR SERPL CREATININE-BSD FRML MDRD: 104 ML/MIN/1.73SQ M
GLUCOSE SERPL-MCNC: 124 MG/DL (ref 65–140)
HCT VFR BLD AUTO: 44.2 % (ref 36.5–49.3)
HGB BLD-MCNC: 14.8 G/DL (ref 12–17)
LIPASE SERPL-CCNC: 30 U/L (ref 11–82)
LYMPHOCYTES # BLD AUTO: 1.05 THOUSAND/UL (ref 0.6–4.47)
LYMPHOCYTES # BLD AUTO: 5 % (ref 14–44)
MCH RBC QN AUTO: 27.8 PG (ref 26.8–34.3)
MCHC RBC AUTO-ENTMCNC: 33.5 G/DL (ref 31.4–37.4)
MCV RBC AUTO: 83 FL (ref 82–98)
MONOCYTES # BLD AUTO: 0.3 THOUSAND/UL (ref 0–1.22)
MONOCYTES NFR BLD: 2 % (ref 4–12)
NEUTROPHILS # BLD MANUAL: 13.64 THOUSAND/UL (ref 1.85–7.62)
NEUTS SEG NFR BLD AUTO: 91 % (ref 43–75)
PLATELET # BLD AUTO: 214 THOUSANDS/UL (ref 149–390)
PLATELET BLD QL SMEAR: ADEQUATE
PMV BLD AUTO: 12.3 FL (ref 8.9–12.7)
POTASSIUM SERPL-SCNC: 4.2 MMOL/L (ref 3.5–5.3)
PROT SERPL-MCNC: 7.7 G/DL (ref 6.4–8.4)
RBC # BLD AUTO: 5.33 MILLION/UL (ref 3.88–5.62)
RBC MORPH BLD: NORMAL
SARS-COV+SARS-COV-2 AG RESP QL IA.RAPID: NEGATIVE
SODIUM SERPL-SCNC: 134 MMOL/L (ref 135–147)
VARIANT LYMPHS # BLD AUTO: 2 %
WBC # BLD AUTO: 14.99 THOUSAND/UL (ref 4.31–10.16)

## 2025-03-21 PROCEDURE — 99285 EMERGENCY DEPT VISIT HI MDM: CPT

## 2025-03-21 PROCEDURE — 96374 THER/PROPH/DIAG INJ IV PUSH: CPT

## 2025-03-21 PROCEDURE — 87811 SARS-COV-2 COVID19 W/OPTIC: CPT

## 2025-03-21 PROCEDURE — 99284 EMERGENCY DEPT VISIT MOD MDM: CPT

## 2025-03-21 PROCEDURE — 71046 X-RAY EXAM CHEST 2 VIEWS: CPT

## 2025-03-21 PROCEDURE — 85007 BL SMEAR W/DIFF WBC COUNT: CPT

## 2025-03-21 PROCEDURE — 80053 COMPREHEN METABOLIC PANEL: CPT

## 2025-03-21 PROCEDURE — 36415 COLL VENOUS BLD VENIPUNCTURE: CPT

## 2025-03-21 PROCEDURE — 85027 COMPLETE CBC AUTOMATED: CPT

## 2025-03-21 PROCEDURE — 87804 INFLUENZA ASSAY W/OPTIC: CPT

## 2025-03-21 PROCEDURE — 93005 ELECTROCARDIOGRAM TRACING: CPT

## 2025-03-21 PROCEDURE — 74177 CT ABD & PELVIS W/CONTRAST: CPT

## 2025-03-21 PROCEDURE — 84484 ASSAY OF TROPONIN QUANT: CPT

## 2025-03-21 PROCEDURE — 96375 TX/PRO/DX INJ NEW DRUG ADDON: CPT

## 2025-03-21 PROCEDURE — 83690 ASSAY OF LIPASE: CPT

## 2025-03-21 RX ORDER — LISINOPRIL 5 MG/1
10 TABLET ORAL DAILY
COMMUNITY

## 2025-03-21 RX ORDER — KETOROLAC TROMETHAMINE 30 MG/ML
15 INJECTION, SOLUTION INTRAMUSCULAR; INTRAVENOUS ONCE
Status: COMPLETED | OUTPATIENT
Start: 2025-03-21 | End: 2025-03-21

## 2025-03-21 RX ORDER — TRAZODONE HYDROCHLORIDE 50 MG/1
50 TABLET ORAL
COMMUNITY

## 2025-03-21 RX ORDER — METOCLOPRAMIDE HYDROCHLORIDE 5 MG/ML
10 INJECTION INTRAMUSCULAR; INTRAVENOUS ONCE
Status: COMPLETED | OUTPATIENT
Start: 2025-03-21 | End: 2025-03-21

## 2025-03-21 RX ORDER — ONDANSETRON 4 MG/1
4 TABLET, FILM COATED ORAL EVERY 6 HOURS
Qty: 12 TABLET | Refills: 0 | Status: SHIPPED | OUTPATIENT
Start: 2025-03-21

## 2025-03-21 RX ORDER — ONDANSETRON 2 MG/ML
4 INJECTION INTRAMUSCULAR; INTRAVENOUS ONCE
Status: COMPLETED | OUTPATIENT
Start: 2025-03-21 | End: 2025-03-21

## 2025-03-21 RX ORDER — DICYCLOMINE HCL 20 MG
20 TABLET ORAL 2 TIMES DAILY
Qty: 20 TABLET | Refills: 0 | Status: SHIPPED | OUTPATIENT
Start: 2025-03-21

## 2025-03-21 RX ADMIN — METOCLOPRAMIDE 10 MG: 5 INJECTION, SOLUTION INTRAMUSCULAR; INTRAVENOUS at 14:58

## 2025-03-21 RX ADMIN — IOHEXOL 100 ML: 350 INJECTION, SOLUTION INTRAVENOUS at 15:32

## 2025-03-21 RX ADMIN — KETOROLAC TROMETHAMINE 15 MG: 30 INJECTION, SOLUTION INTRAMUSCULAR; INTRAVENOUS at 14:57

## 2025-03-21 RX ADMIN — ONDANSETRON 4 MG: 2 INJECTION INTRAMUSCULAR; INTRAVENOUS at 14:02

## 2025-03-21 NOTE — DISCHARGE INSTRUCTIONS
Encourage use of Zofran and Bentyl for treatment of abdominal pain, nausea and vomiting.  Encourage follow up with gastroenterology, PCP for further evaluation and treatment.   If you develop any new or worsening symptoms please immediately return to your nearest emergency department.

## 2025-03-21 NOTE — ED PROVIDER NOTES
Time reflects when diagnosis was documented in both MDM as applicable and the Disposition within this note       Time User Action Codes Description Comment    3/21/2025  4:17 PM Rupali Adame Add [R11.2] Nausea and vomiting     3/21/2025  4:17 PM Rupali Adame Add [R10.9] Right sided abdominal pain           ED Disposition       ED Disposition   Discharge    Condition   Stable    Date/Time   Fri Mar 21, 2025  4:16 PM    Comment   Leann Mcnair discharge to home/self care.                   Assessment & Plan       Medical Decision Making  30 y.o. female presents to ED for evaluation of sternal chest pain, nausea and vomiting, right-sided abdominal pain x 1 day.  Further details in HPI.     Ddx: Includes but not limited to ACS, appendicitis, cholecystitis, choledocholithiasis, bowel obstruction, volvulus, pancreatitis.     Plan lab evaluation, EKG, chest x-ray, CT abdomen pelvis with contrast.  Symptomatic management with Zofran.  Initial 4 mg dose of Zofran reportedly did not help current symptoms with continued vomiting, given Reglan and Toradol with near complete resolution of symptoms.  HEART score calculated, score 0 therefore recommended PCP follow-up at discharge.    On re-evaluation: well appearing in NAD, vital signs are normal. A&O x 3, airway patent, no chest pain or respiratory distress, Abdomen non distended, non tender. M/S no gross deformity, GCS 15     Final Evaluation:  (see ED course for additional MDM)  Given improvement of symptoms with Zofran, Reglan, Toradol, and workup unremarkable for acute abnormality, patient deemed stable for discharge.  Given acute nausea and vomiting associated with abdominal pain, patient given gastroenterology follow-up for further evaluation and treatment.  Prescribed Zofran and Bentyl on discharge for treatment of acute nausea vomiting, abdominal pain.  Additionally encourage PCP follow-up.  Patient expressed understanding, all questions answered.    -Stable for  discharge and outpatient management.   -Reviewed diagnosis, treatment plan, and expectant coarse  -Verbal and written instructions given to follow up with pcp and recommended specialist    -Discussed reasons to Return to ED.  Patient verbalized understanding of reasons return to the ED.   -Opportunity provided for questions and all questions were answered.      Amount and/or Complexity of Data Reviewed  Labs: ordered. Decision-making details documented in ED Course.  Radiology: ordered. Decision-making details documented in ED Course.    Risk  Prescription drug management.        ED Course as of 03/21/25 1701   Fri Mar 21, 2025   1414 WBC(!): 14.99   1628 XR chest 2 views  No acute cardiopulmonary disease.   1628 CT abdomen pelvis with contrast  No acute abnormality in the abdomen or pelvis.       Medications   ondansetron (ZOFRAN) injection 4 mg (4 mg Intravenous Given 3/21/25 1402)   ketorolac (TORADOL) injection 15 mg (15 mg Intravenous Given 3/21/25 1457)   metoclopramide (REGLAN) injection 10 mg (10 mg Intravenous Given 3/21/25 1458)   iohexol (OMNIPAQUE) 350 MG/ML injection (SINGLE-DOSE) 100 mL (100 mL Intravenous Given 3/21/25 1532)       ED Risk Strat Scores      HEART Risk Score      Flowsheet Row Most Recent Value   Heart Score Risk Calculator    History 0 Filed at: 03/21/2025 1701   ECG 0 Filed at: 03/21/2025 1701   Age 0 Filed at: 03/21/2025 1701   Risk Factors 0 Filed at: 03/21/2025 1701   Troponin 0 Filed at: 03/21/2025 1701   HEART Score 0 Filed at: 03/21/2025 1701                              SBIRT 20yo+      Flowsheet Row Most Recent Value   Initial Alcohol Screen: US AUDIT-C     1. How often do you have a drink containing alcohol? 0 Filed at: 03/21/2025 1331   2. How many drinks containing alcohol do you have on a typical day you are drinking?  0 Filed at: 03/21/2025 1331   3a. Male UNDER 65: How often do you have five or more drinks on one occasion? 0 Filed at: 03/21/2025 1331   3b. FEMALE Any  Age, or MALE 65+: How often do you have 4 or more drinks on one occassion? 0 Filed at: 03/21/2025 1841   Audit-C Score 0 Filed at: 03/21/2025 1331   EDUARDO: How many times in the past year have you...    Used an illegal drug or used a prescription medication for non-medical reasons? Never Filed at: 03/21/2025 1331                            History of Present Illness       Chief Complaint   Patient presents with    Abdominal Pain     Patient arrives via EMS with c/o generalized abdominal pain with N/V starting this morning.        Past Medical History:   Diagnosis Date    ADHD     Allergic     Anxiety     Asthma     Depression     Dysuria     Last assessed - 1/24/14    Suicide attempt (HCC)       History reviewed. No pertinent surgical history.   Family History   Problem Relation Age of Onset    RACHID disease Mother         Reflux    Other Mother         Rhinitis    No Known Problems Father     Allergies Cousin         Allergic Disorder    Asthma Cousin       Social History     Tobacco Use    Smoking status: Every Day     Current packs/day: 0.25     Average packs/day: 0.3 packs/day for 8.7 years (2.2 ttl pk-yrs)     Types: Cigarettes, Cigars     Start date: 6/27/2016    Smokeless tobacco: Never    Tobacco comments:     Black-a-mile   Vaping Use    Vaping status: Never Used   Substance Use Topics    Alcohol use: Yes     Alcohol/week: 2.0 standard drinks of alcohol     Types: 2 Cans of beer per week    Drug use: Not Currently     Types: Marijuana, Amphetamines, Methamphetamines      E-Cigarette/Vaping    E-Cigarette Use Never User       E-Cigarette/Vaping Substances      I have reviewed and agree with the history as documented.     Patient is a 30-year-old male with history of amphetamine and methamphetamine use, extensive psychiatric history presenting with 1 day history of nausea vomiting and right-sided abdominal pain.  Patient states he woke up this morning with generalized abdominal pain, nausea.  States about an  hour later he had an episode of nonbloody vomitus, has had approximately 10 more similar episodes today.  Unable to tolerate any oral intake.  Additionally endorses initial generalized abdominal pain that has now localized to right-sided abdominal pain.  Of note, additionally endorses sternal chest pain is dull in nature, intermittent not associated with shortness of breath.  Does state that he took trazodone for the first time last night, did not experience any symptoms at that time.  Denies palpitations, numbness or tingling, fevers or chills.      Abdominal Pain  Associated symptoms: nausea and vomiting    Associated symptoms: no chest pain, no chills, no cough, no dysuria, no fever, no hematuria, no shortness of breath and no sore throat        Review of Systems   Constitutional:  Negative for chills and fever.   HENT:  Negative for ear pain and sore throat.    Eyes:  Negative for pain and visual disturbance.   Respiratory:  Negative for cough and shortness of breath.    Cardiovascular:  Negative for chest pain and palpitations.   Gastrointestinal:  Positive for abdominal pain, nausea and vomiting.   Genitourinary:  Negative for dysuria and hematuria.   Musculoskeletal:  Negative for arthralgias and back pain.   Skin:  Negative for color change and rash.   Neurological:  Negative for seizures and syncope.   All other systems reviewed and are negative.          Objective       ED Triage Vitals   Temperature Pulse Blood Pressure Respirations SpO2 Patient Position - Orthostatic VS   03/21/25 1405 03/21/25 1333 03/21/25 1333 03/21/25 1333 03/21/25 1333 03/21/25 1333   98.2 °F (36.8 °C) 68 (!) 170/101 18 100 % Sitting      Temp Source Heart Rate Source BP Location FiO2 (%) Pain Score    03/21/25 1405 03/21/25 1333 03/21/25 1333 -- 03/21/25 1333    Oral Monitor Right arm  9      Vitals      Date and Time Temp Pulse SpO2 Resp BP Pain Score FACES Pain Rating User   03/21/25 1500 -- 61 100 % 17 167/95 -- -- EK   03/21/25  1457 -- -- -- -- -- 10 - Worst Possible Pain -- EK   03/21/25 1405 98.2 °F (36.8 °C) -- -- -- -- -- -- EK   03/21/25 1333 -- 68 100 % 18 170/101 9 -- EK            Physical Exam  Vitals and nursing note reviewed.   Constitutional:       General: He is not in acute distress.     Appearance: He is not ill-appearing.   HENT:      Head: Normocephalic and atraumatic.      Right Ear: External ear normal.      Left Ear: External ear normal.      Nose: Nose normal.      Mouth/Throat:      Pharynx: Oropharynx is clear. No oropharyngeal exudate or posterior oropharyngeal erythema.   Eyes:      General: No scleral icterus.     Conjunctiva/sclera: Conjunctivae normal.   Cardiovascular:      Rate and Rhythm: Normal rate.      Pulses: Normal pulses.   Pulmonary:      Effort: Pulmonary effort is normal. No respiratory distress.      Breath sounds: No wheezing, rhonchi or rales.   Chest:      Chest wall: No tenderness.   Abdominal:      General: There is no distension.      Palpations: Abdomen is soft.      Tenderness: There is abdominal tenderness. There is no guarding or rebound.   Musculoskeletal:         General: Normal range of motion.      Cervical back: Normal range of motion. No rigidity.      Right lower leg: No edema.      Left lower leg: No edema.   Skin:     General: Skin is warm and dry.      Findings: No erythema.   Neurological:      General: No focal deficit present.      Mental Status: He is alert and oriented to person, place, and time.   Psychiatric:         Mood and Affect: Mood normal.         Behavior: Behavior normal.         Results Reviewed       Procedure Component Value Units Date/Time    HS Troponin I 2hr [[id number]] Collected: 03/21/25 1558    Lab Status: Final result Specimen: Blood from Arm, Left Updated: 03/21/25 1629     hs TnI 2hr <2 ng/L      Delta 2hr hsTnI --    HS Troponin I 4hr [[id number]]     Lab Status: No result Specimen: Blood     HS Troponin 0hr (reflex protocol) [[id number]]  (Normal)  Collected: 03/21/25 1401    Lab Status: Final result Specimen: Blood from Arm, Left Updated: 03/21/25 1436     hs TnI 0hr <2 ng/L     CBC and differential [[id number]]  (Abnormal) Collected: 03/21/25 1401    Lab Status: Final result Specimen: Blood from Arm, Left Updated: 03/21/25 1434     WBC 14.99 Thousand/uL      RBC 5.33 Million/uL      Hemoglobin 14.8 g/dL      Hematocrit 44.2 %      MCV 83 fL      MCH 27.8 pg      MCHC 33.5 g/dL      RDW 12.2 %      MPV 12.3 fL      Platelets 214 Thousands/uL     Narrative:      This is an appended report.  These results have been appended to a previously verified report.    Manual Differential(PHLEBS Do Not Order) [[id number]] Collected: 03/21/25 1401    Lab Status: In process Specimen: Blood from Arm, Left Updated: 03/21/25 1434    FLU/COVID Rapid Antigen (30 min. TAT) - Preferred screening test in ED [[id number]]  (Normal) Collected: 03/21/25 1407    Lab Status: Final result Specimen: Nares from Nose Updated: 03/21/25 1433     SARS COV Rapid Antigen Negative     Influenza A Rapid Antigen Negative     Influenza B Rapid Antigen Negative    Narrative:      This test has been performed using the Quidel Hien 2 FLU+SARS Antigen test under the Emergency Use Authorization (EUA). This test has been validated by the  and verified by the performing laboratory. The Hien uses lateral flow immunofluorescent sandwich assay to detect SARS-COV, Influenza A and Influenza B Antigen.     The Quidel Hien 2 SARS Antigen test does not differentiate between SARS-CoV and SARS-CoV-2.     Negative results are presumptive and may be confirmed with a molecular assay, if necessary, for patient management. Negative results do not rule out SARS-CoV-2 or influenza infection and should not be used as the sole basis for treatment or patient management decisions. A negative test result may occur if the level of antigen in a sample is below the limit of detection of this test.     Positive results  are indicative of the presence of viral antigens, but do not rule out bacterial infection or co-infection with other viruses.     All test results should be used as an adjunct to clinical observations and other information available to the provider.    FOR PEDIATRIC PATIENTS - copy/paste COVID Guidelines URL to browser: [email or web address]    Comprehensive metabolic panel [[id number]]  (Abnormal) Collected: 03/21/25 1401    Lab Status: Final result Specimen: Blood from Arm, Left Updated: 03/21/25 1430     Sodium 134 mmol/L      Potassium 4.2 mmol/L      Chloride 102 mmol/L      CO2 27 mmol/L      ANION GAP 5 mmol/L      BUN 9 mg/dL      Creatinine 0.97 mg/dL      Glucose 124 mg/dL      Calcium 9.5 mg/dL      AST 25 U/L      ALT 21 U/L      Alkaline Phosphatase 46 U/L      Total Protein 7.7 g/dL      Albumin 4.7 g/dL      Total Bilirubin 0.51 mg/dL      eGFR 104 ml/min/1.73sq m     Narrative:      National Kidney Disease Foundation guidelines for Chronic Kidney Disease (CKD):     Stage 1 with normal or high GFR (GFR > 90 mL/min/1.73 square meters)    Stage 2 Mild CKD (GFR = 60-89 mL/min/1.73 square meters)    Stage 3A Moderate CKD (GFR = 45-59 mL/min/1.73 square meters)    Stage 3B Moderate CKD (GFR = 30-44 mL/min/1.73 square meters)    Stage 4 Severe CKD (GFR = 15-29 mL/min/1.73 square meters)    Stage 5 End Stage CKD (GFR <15 mL/min/1.73 square meters)  Note: GFR calculation is accurate only with a steady state creatinine    Lipase [[id number]]  (Normal) Collected: 03/21/25 1401    Lab Status: Final result Specimen: Blood from Arm, Left Updated: 03/21/25 1430     Lipase 30 u/L             CT abdomen pelvis with contrast   Final Interpretation by Vin Albrecht MD (03/21 1612)      No acute abnormality in the abdomen or pelvis.         Workstation performed: [id number]         XR chest 2 views   Final Interpretation by Rajani Bell MD (03/21 7380)       No acute cardiopulmonary disease.            Workstation performed: [id number]             ECG 12 Lead Documentation Only    Date/Time: 3/21/2025 2:05 PM    Performed by: Rupali Adame PA-C  Authorized by: Rupali Adame PA-C    Patient location:  ED  Previous ECG:     Previous ECG:  Compared to current    Comparison ECG info:  5/28/20    Similarity:  No change  Interpretation:     Interpretation: normal    Rate:     ECG rate assessment: normal    Rhythm:     Rhythm: sinus rhythm    QRS:     QRS axis:  Normal  Conduction:     Conduction: normal    ST segments:     ST segments:  Normal  T waves:     T waves: normal    Comments:      Normal sinus rhythm at 75, normal rate, normal axis.  ECG 12 Lead Documentation Only    Date/Time: 3/21/2025 3:56 PM    Performed by: Rupali Adame PA-C  Authorized by: Rupali Adame PA-C    ECG reviewed by me, the ED Provider: yes    Patient location:  ED  Previous ECG:     Previous ECG:  Compared to current    Similarity:  No change  Interpretation:     Interpretation: normal    Rate:     ECG rate assessment: normal    Rhythm:     Rhythm: sinus rhythm    QRS:     QRS axis:  Normal  ST segments:     ST segments:  Normal  T waves:     T waves: normal    Comments:      Normal sinus rhythm with normal rate, right axis deviation.      ED Medication and Procedure Management   Prior to Admission Medications   Prescriptions Last Dose Informant Patient Reported? Taking?   doxepin (SINEquan) 10 mg capsule   No No   Sig: take 1 capsule by mouth at bedtime   Patient not taking: Reported on 8/15/2024   lisinopril (ZESTRIL) 5 mg tablet   Yes Yes   Sig: Take 10 mg by mouth daily   traZODone (DESYREL) 50 mg tablet   Yes Yes   Sig: Take 50 mg by mouth daily at bedtime      Facility-Administered Medications: None     Discharge Medication List as of 3/21/2025  4:34 PM        START taking these medications    Details   dicyclomine (BENTYL) 20 mg tablet Take 1 tablet (20 mg total) by mouth 2 (two)  times a day, Starting Fri 3/21/2025, Normal      ondansetron (ZOFRAN) 4 mg tablet Take 1 tablet (4 mg total) by mouth every 6 (six) hours, Starting Fri 3/21/2025, Normal           CONTINUE these medications which have NOT CHANGED    Details   lisinopril (ZESTRIL) 5 mg tablet Take 10 mg by mouth daily, Historical Med      traZODone (DESYREL) 50 mg tablet Take 50 mg by mouth daily at bedtime, Historical Med      doxepin (SINEquan) 10 mg capsule take 1 capsule by mouth at bedtime, Normal             ED SEPSIS DOCUMENTATION   Time reflects when diagnosis was documented in both MDM as applicable and the Disposition within this note       Time User Action Codes Description Comment    3/21/2025  4:17 PM Rupali Adame [R11.2] Nausea and vomiting     3/21/2025  4:17 PM Rupali Adame Add [R10.9] Right sided abdominal pain                  Rupali Adame PA-C  03/21/25 1183

## 2025-03-21 NOTE — ED ATTENDING ATTESTATION
3/21/2025  I, Annamaria Gonzalez MD, saw and evaluated the patient. I have discussed the patient with the resident/non-physician practitioner and agree with the resident's/non-physician practitioner's findings, Plan of Care, and MDM as documented in the resident's/non-physician practitioner's note, except where noted. All available labs and Radiology studies were reviewed.  I was present for key portions of any procedure(s) performed by the resident/non-physician practitioner and I was immediately available to provide assistance.       At this point I agree with the current assessment done in the Emergency Department.  I have conducted an independent evaluation of this patient a history and physical is as follows:    ED Course     Chief Complaint   Patient presents with    Abdominal Pain     Patient arrives via EMS with c/o generalized abdominal pain with N/V starting this morning.      30-year-old man with past medical history of ADHD, anxiety, depression and asthma presents to the ED for evaluation of abdominal pain.  He reports associated nausea and vomiting.  Vomiting is nonbloody nonbilious.  Also reports associated body aches.  Symptoms all started this morning.  Unsure if he has sick contacts.  Has not tried any medications or therapies for symptoms.  Denies any alleviating or aggravating factors. On exam the patient is awake, alert, and comfortable. Mucous membranes are moist. The patient's heart is regular. No respiratory distress.  Abdomen non-distended, diffusely tender to palpation, no guarding, no rebound, no rigidity. Moves all extremities. Patient neurologically nonfocal. No skin rashes. Back without deformities. MDM: Work-up to include blood work, CBC as marker of infectivity, hemoconcentration for hydration status, CMP to check for electrolytes, renal function, liver function, Lipase to check for pancreatitis, CT scan to evaluate for potential intra-abdominal surgical pathology. EKG and troponin to  evaluate for potential ACS component.          Critical Care Time  Procedures

## 2025-03-22 LAB
ATRIAL RATE: 75 BPM
ATRIAL RATE: 80 BPM
P AXIS: 39 DEGREES
P AXIS: 63 DEGREES
PR INTERVAL: 166 MS
PR INTERVAL: 170 MS
QRS AXIS: 83 DEGREES
QRS AXIS: 92 DEGREES
QRSD INTERVAL: 94 MS
QRSD INTERVAL: 96 MS
QT INTERVAL: 384 MS
QT INTERVAL: 404 MS
QTC INTERVAL: 428 MS
QTC INTERVAL: 465 MS
T WAVE AXIS: 47 DEGREES
T WAVE AXIS: 58 DEGREES
VENTRICULAR RATE: 75 BPM
VENTRICULAR RATE: 80 BPM

## 2025-03-22 PROCEDURE — 93010 ELECTROCARDIOGRAM REPORT: CPT

## 2025-04-03 ENCOUNTER — TELEPHONE (OUTPATIENT)
Age: 30
End: 2025-04-03

## 2025-04-03 NOTE — TELEPHONE ENCOUNTER
Contacted patient off of Medication Management  wait list to verify needs of services in attempts to update list with patient preferences. spoke with patient whom stated they are still interested in services         Insurance: [employer]   Insurance Type:    Commercial []   Medicaid [x]   [place] Medicare []  Location Preference: [facility]  Provider Preference: no provider preferences.   Virtual: Yes [] No [x]  Were outside resources sent: Yes [x] No [] Via mail

## 2025-04-18 ENCOUNTER — HOSPITAL ENCOUNTER (INPATIENT)
Facility: HOSPITAL | Age: 30
LOS: 7 days | Discharge: HOME | End: 2025-04-25
Attending: STUDENT IN AN ORGANIZED HEALTH CARE EDUCATION/TRAINING PROGRAM | Admitting: STUDENT IN AN ORGANIZED HEALTH CARE EDUCATION/TRAINING PROGRAM
Payer: COMMERCIAL

## 2025-04-18 ENCOUNTER — HOSPITAL ENCOUNTER (EMERGENCY)
Facility: HOSPITAL | Age: 30
End: 2025-04-18
Attending: EMERGENCY MEDICINE
Payer: MEDICARE

## 2025-04-18 VITALS
DIASTOLIC BLOOD PRESSURE: 107 MMHG | HEART RATE: 79 BPM | RESPIRATION RATE: 18 BRPM | SYSTOLIC BLOOD PRESSURE: 135 MMHG | OXYGEN SATURATION: 100 % | TEMPERATURE: 97.9 F

## 2025-04-18 DIAGNOSIS — F29 PSYCHOTIC DISORDER (HCC): Primary | ICD-10-CM

## 2025-04-18 DIAGNOSIS — R44.0 AUDITORY HALLUCINATIONS: ICD-10-CM

## 2025-04-18 DIAGNOSIS — F41.9 ANXIETY: ICD-10-CM

## 2025-04-18 DIAGNOSIS — Z00.8 MEDICAL CLEARANCE FOR PSYCHIATRIC ADMISSION: ICD-10-CM

## 2025-04-18 DIAGNOSIS — I15.9 SECONDARY HYPERTENSION: ICD-10-CM

## 2025-04-18 DIAGNOSIS — R44.1 VISUAL HALLUCINATIONS: ICD-10-CM

## 2025-04-18 DIAGNOSIS — Z00.8 ENCOUNTER FOR PSYCHOLOGICAL EVALUATION: Primary | ICD-10-CM

## 2025-04-18 LAB
AMPHETAMINES SERPL QL SCN: NEGATIVE
BARBITURATES UR QL: NEGATIVE
BENZODIAZ UR QL: NEGATIVE
COCAINE UR QL: NEGATIVE
ETHANOL EXG-MCNC: 0 MG/DL
FENTANYL UR QL SCN: NEGATIVE
HYDROCODONE UR QL SCN: NEGATIVE
METHADONE UR QL: NEGATIVE
OPIATES UR QL SCN: NEGATIVE
OXYCODONE+OXYMORPHONE UR QL SCN: NEGATIVE
PCP UR QL: NEGATIVE
THC UR QL: NEGATIVE

## 2025-04-18 PROCEDURE — 82075 ASSAY OF BREATH ETHANOL: CPT

## 2025-04-18 PROCEDURE — 99285 EMERGENCY DEPT VISIT HI MDM: CPT

## 2025-04-18 PROCEDURE — 80307 DRUG TEST PRSMV CHEM ANLYZR: CPT

## 2025-04-18 RX ORDER — MINERAL OIL AND PETROLATUM 150; 830 MG/G; MG/G
OINTMENT OPHTHALMIC 4 TIMES DAILY PRN
Status: DISCONTINUED | OUTPATIENT
Start: 2025-04-18 | End: 2025-04-25 | Stop reason: HOSPADM

## 2025-04-18 RX ORDER — MINERAL OIL AND PETROLATUM 150; 830 MG/G; MG/G
OINTMENT OPHTHALMIC 4 TIMES DAILY PRN
Status: CANCELLED | OUTPATIENT
Start: 2025-04-18

## 2025-04-18 RX ORDER — OLANZAPINE 5 MG/1
10 TABLET, FILM COATED ORAL
Status: CANCELLED | OUTPATIENT
Start: 2025-04-18

## 2025-04-18 RX ORDER — LORAZEPAM 2 MG/ML
2 INJECTION INTRAMUSCULAR EVERY 6 HOURS PRN
Status: DISCONTINUED | OUTPATIENT
Start: 2025-04-18 | End: 2025-04-25 | Stop reason: HOSPADM

## 2025-04-18 RX ORDER — OLANZAPINE 5 MG/1
2.5 TABLET, FILM COATED ORAL
Status: CANCELLED | OUTPATIENT
Start: 2025-04-18

## 2025-04-18 RX ORDER — BENZTROPINE MESYLATE 1 MG/1
1 TABLET ORAL 2 TIMES DAILY PRN
Status: CANCELLED | OUTPATIENT
Start: 2025-04-18

## 2025-04-18 RX ORDER — PROPRANOLOL HCL 20 MG
10 TABLET ORAL EVERY 8 HOURS PRN
Status: CANCELLED | OUTPATIENT
Start: 2025-04-18

## 2025-04-18 RX ORDER — ACETAMINOPHEN 325 MG/1
975 TABLET ORAL EVERY 6 HOURS PRN
Status: DISCONTINUED | OUTPATIENT
Start: 2025-04-18 | End: 2025-04-25 | Stop reason: HOSPADM

## 2025-04-18 RX ORDER — HYDROXYZINE HYDROCHLORIDE 25 MG/1
25 TABLET, FILM COATED ORAL
Status: CANCELLED | OUTPATIENT
Start: 2025-04-18

## 2025-04-18 RX ORDER — AMOXICILLIN 250 MG
1 CAPSULE ORAL DAILY PRN
Status: CANCELLED | OUTPATIENT
Start: 2025-04-18

## 2025-04-18 RX ORDER — MAGNESIUM HYDROXIDE/ALUMINUM HYDROXICE/SIMETHICONE 120; 1200; 1200 MG/30ML; MG/30ML; MG/30ML
30 SUSPENSION ORAL EVERY 4 HOURS PRN
Status: CANCELLED | OUTPATIENT
Start: 2025-04-18

## 2025-04-18 RX ORDER — OLANZAPINE 5 MG/1
5 TABLET, FILM COATED ORAL
Status: DISCONTINUED | OUTPATIENT
Start: 2025-04-18 | End: 2025-04-25 | Stop reason: HOSPADM

## 2025-04-18 RX ORDER — OLANZAPINE 2.5 MG/1
2.5 TABLET, FILM COATED ORAL
Status: DISCONTINUED | OUTPATIENT
Start: 2025-04-18 | End: 2025-04-25 | Stop reason: HOSPADM

## 2025-04-18 RX ORDER — BENZTROPINE MESYLATE 1 MG/ML
1 INJECTION, SOLUTION INTRAMUSCULAR; INTRAVENOUS 2 TIMES DAILY PRN
Status: CANCELLED | OUTPATIENT
Start: 2025-04-18

## 2025-04-18 RX ORDER — DIPHENHYDRAMINE HYDROCHLORIDE 50 MG/ML
50 INJECTION, SOLUTION INTRAMUSCULAR; INTRAVENOUS EVERY 6 HOURS PRN
Status: CANCELLED | OUTPATIENT
Start: 2025-04-18

## 2025-04-18 RX ORDER — NICOTINE 21 MG/24HR
1 PATCH, TRANSDERMAL 24 HOURS TRANSDERMAL DAILY
Status: CANCELLED | OUTPATIENT
Start: 2025-04-18

## 2025-04-18 RX ORDER — HYDROXYZINE HYDROCHLORIDE 50 MG/1
50 TABLET, FILM COATED ORAL
Status: DISCONTINUED | OUTPATIENT
Start: 2025-04-18 | End: 2025-04-25 | Stop reason: HOSPADM

## 2025-04-18 RX ORDER — NICOTINE 21 MG/24HR
1 PATCH, TRANSDERMAL 24 HOURS TRANSDERMAL DAILY
Status: DISCONTINUED | OUTPATIENT
Start: 2025-04-19 | End: 2025-04-25 | Stop reason: HOSPADM

## 2025-04-18 RX ORDER — LORAZEPAM 1 MG/1
1 TABLET ORAL ONCE
Status: COMPLETED | OUTPATIENT
Start: 2025-04-18 | End: 2025-04-18

## 2025-04-18 RX ORDER — TRAZODONE HYDROCHLORIDE 50 MG/1
50 TABLET ORAL
Status: DISCONTINUED | OUTPATIENT
Start: 2025-04-18 | End: 2025-04-25 | Stop reason: HOSPADM

## 2025-04-18 RX ORDER — HYDROXYZINE HYDROCHLORIDE 25 MG/1
50 TABLET, FILM COATED ORAL
Status: CANCELLED | OUTPATIENT
Start: 2025-04-18

## 2025-04-18 RX ORDER — OLANZAPINE 5 MG/1
5 TABLET, FILM COATED ORAL
Status: CANCELLED | OUTPATIENT
Start: 2025-04-18

## 2025-04-18 RX ORDER — ACETAMINOPHEN 325 MG/1
650 TABLET ORAL EVERY 6 HOURS PRN
Status: CANCELLED | OUTPATIENT
Start: 2025-04-18

## 2025-04-18 RX ORDER — HYDROXYZINE HYDROCHLORIDE 25 MG/1
100 TABLET, FILM COATED ORAL
Status: CANCELLED | OUTPATIENT
Start: 2025-04-18

## 2025-04-18 RX ORDER — ACETAMINOPHEN 325 MG/1
650 TABLET ORAL EVERY 4 HOURS PRN
Status: DISCONTINUED | OUTPATIENT
Start: 2025-04-18 | End: 2025-04-25 | Stop reason: HOSPADM

## 2025-04-18 RX ORDER — OLANZAPINE 10 MG/2ML
5 INJECTION, POWDER, FOR SOLUTION INTRAMUSCULAR
Status: CANCELLED | OUTPATIENT
Start: 2025-04-18

## 2025-04-18 RX ORDER — HYDROXYZINE HYDROCHLORIDE 25 MG/1
25 TABLET, FILM COATED ORAL
Status: DISCONTINUED | OUTPATIENT
Start: 2025-04-18 | End: 2025-04-25 | Stop reason: HOSPADM

## 2025-04-18 RX ORDER — LORAZEPAM 2 MG/ML
2 INJECTION INTRAMUSCULAR EVERY 6 HOURS PRN
Status: CANCELLED | OUTPATIENT
Start: 2025-04-18

## 2025-04-18 RX ORDER — DIPHENHYDRAMINE HYDROCHLORIDE 50 MG/ML
50 INJECTION, SOLUTION INTRAMUSCULAR; INTRAVENOUS EVERY 6 HOURS PRN
Status: DISCONTINUED | OUTPATIENT
Start: 2025-04-18 | End: 2025-04-25 | Stop reason: HOSPADM

## 2025-04-18 RX ORDER — BISACODYL 10 MG
10 SUPPOSITORY, RECTAL RECTAL DAILY PRN
Status: DISCONTINUED | OUTPATIENT
Start: 2025-04-18 | End: 2025-04-25 | Stop reason: HOSPADM

## 2025-04-18 RX ORDER — PROPRANOLOL HYDROCHLORIDE 10 MG/1
10 TABLET ORAL EVERY 8 HOURS PRN
Status: DISCONTINUED | OUTPATIENT
Start: 2025-04-18 | End: 2025-04-25 | Stop reason: HOSPADM

## 2025-04-18 RX ORDER — OLANZAPINE 10 MG/1
10 TABLET, FILM COATED ORAL
Status: DISCONTINUED | OUTPATIENT
Start: 2025-04-18 | End: 2025-04-20

## 2025-04-18 RX ORDER — MAGNESIUM HYDROXIDE/ALUMINUM HYDROXICE/SIMETHICONE 120; 1200; 1200 MG/30ML; MG/30ML; MG/30ML
30 SUSPENSION ORAL EVERY 4 HOURS PRN
Status: DISCONTINUED | OUTPATIENT
Start: 2025-04-18 | End: 2025-04-25 | Stop reason: HOSPADM

## 2025-04-18 RX ORDER — BENZTROPINE MESYLATE 1 MG/1
1 TABLET ORAL 2 TIMES DAILY PRN
Status: DISCONTINUED | OUTPATIENT
Start: 2025-04-18 | End: 2025-04-25 | Stop reason: HOSPADM

## 2025-04-18 RX ORDER — AMOXICILLIN 250 MG
1 CAPSULE ORAL DAILY PRN
Status: DISCONTINUED | OUTPATIENT
Start: 2025-04-18 | End: 2025-04-25 | Stop reason: HOSPADM

## 2025-04-18 RX ORDER — HYDROXYZINE HYDROCHLORIDE 50 MG/1
100 TABLET, FILM COATED ORAL
Status: DISCONTINUED | OUTPATIENT
Start: 2025-04-18 | End: 2025-04-25 | Stop reason: HOSPADM

## 2025-04-18 RX ORDER — ACETAMINOPHEN 325 MG/1
650 TABLET ORAL EVERY 4 HOURS PRN
Status: CANCELLED | OUTPATIENT
Start: 2025-04-18

## 2025-04-18 RX ORDER — TRAZODONE HYDROCHLORIDE 50 MG/1
50 TABLET ORAL
Status: CANCELLED | OUTPATIENT
Start: 2025-04-18

## 2025-04-18 RX ORDER — OLANZAPINE 10 MG/2ML
5 INJECTION, POWDER, FOR SOLUTION INTRAMUSCULAR
Status: DISCONTINUED | OUTPATIENT
Start: 2025-04-18 | End: 2025-04-25 | Stop reason: HOSPADM

## 2025-04-18 RX ORDER — OLANZAPINE 10 MG/2ML
10 INJECTION, POWDER, FOR SOLUTION INTRAMUSCULAR
Status: DISCONTINUED | OUTPATIENT
Start: 2025-04-18 | End: 2025-04-20

## 2025-04-18 RX ORDER — BISACODYL 10 MG
10 SUPPOSITORY, RECTAL RECTAL DAILY PRN
Status: CANCELLED | OUTPATIENT
Start: 2025-04-18

## 2025-04-18 RX ORDER — ACETAMINOPHEN 325 MG/1
975 TABLET ORAL EVERY 6 HOURS PRN
Status: CANCELLED | OUTPATIENT
Start: 2025-04-18

## 2025-04-18 RX ORDER — OLANZAPINE 10 MG/2ML
10 INJECTION, POWDER, FOR SOLUTION INTRAMUSCULAR
Status: CANCELLED | OUTPATIENT
Start: 2025-04-18

## 2025-04-18 RX ORDER — ACETAMINOPHEN 325 MG/1
650 TABLET ORAL EVERY 6 HOURS PRN
Status: DISCONTINUED | OUTPATIENT
Start: 2025-04-18 | End: 2025-04-25 | Stop reason: HOSPADM

## 2025-04-18 RX ORDER — BENZTROPINE MESYLATE 1 MG/ML
1 INJECTION, SOLUTION INTRAMUSCULAR; INTRAVENOUS 2 TIMES DAILY PRN
Status: DISCONTINUED | OUTPATIENT
Start: 2025-04-18 | End: 2025-04-25 | Stop reason: HOSPADM

## 2025-04-18 RX ADMIN — HYDROXYZINE HYDROCHLORIDE 50 MG: 50 TABLET, FILM COATED ORAL at 20:14

## 2025-04-18 RX ADMIN — LORAZEPAM 1 MG: 1 TABLET ORAL at 11:42

## 2025-04-18 RX ADMIN — TRAZODONE HYDROCHLORIDE 50 MG: 50 TABLET ORAL at 21:16

## 2025-04-18 RX ADMIN — LORAZEPAM 1 MG: 1 TABLET ORAL at 16:34

## 2025-04-18 RX ADMIN — OLANZAPINE 10 MG: 10 TABLET, FILM COATED ORAL at 20:14

## 2025-04-18 RX ADMIN — NICOTINE POLACRILEX 4 MG: 4 GUM, CHEWING BUCCAL at 21:00

## 2025-04-18 NOTE — NURSING NOTE
"Pt is a 201 from [place] [facility]. Pt presented to ED with psychosis, paranoia, and hallucinations. Upon admission Pt reports anxiety and moderate depression as well. Pt denies SI/HI. Reports AH that are negative. Reports VH of \"thermal lights and people.\" Pt reports having hallucinations for 5 years however, recently they are getting worse. Reports struggling with ADL's d/t voices. Reports struggling to hold a job d/t voices. Pt reports 2 prior inpatients. Pt was last admitted tin 2020 at [email or web address].. Hx: HTN (takes lisinopril 5 mg). Pt reports poor sleep, good appetite. UDS - Cooperative and pleasant during admission process. Pt lives with mother and step dad where he can return after discharge.   "

## 2025-04-18 NOTE — NURSING NOTE
Dr. Freeman notified via [employer] secure chat that pt's medication reconciliation is completed and that pt scored low risk on C-SSRS life time scale upon admission.

## 2025-04-18 NOTE — PLAN OF CARE
Problem: Depression  Goal: Treatment Goal: Demonstrate behavioral control of depressive symptoms, verbalize feelings of improved mood/affect, and adopt new coping skills prior to discharge  Outcome: Progressing     Problem: Anxiety  Goal: Anxiety is at manageable level  Description: Interventions:- Assess and monitor patient's anxiety level. - Monitor for signs and symptoms (heart palpitations, chest pain, shortness of breath, headaches, nausea, feeling jumpy, restlessness, irritable, apprehensive). - Collaborate with interdisciplinary team and initiate plan and interventions as ordered.- [place] patient to unit/surroundings- Explain treatment plan- Encourage participation in care- Encourage verbalization of concerns/fears- Identify coping mechanisms- Assist in developing anxiety-reducing skills- Administer/offer alternative therapies- Limit or eliminate stimulants  Outcome: Progressing

## 2025-04-18 NOTE — ED NOTES
Patient is accepted at [facility]  Patient is accepted by Jean Castellano MD.     Transportation is arranged with: [facility].  Transportation is scheduled for 1830.   Patient may go to the floor after 1800.      Nurse report is to be called to [phone] prior to patient transfer.

## 2025-04-18 NOTE — ED NOTES
Insurance Authorization for admission:   Phone call placed to [facility]  Phone number: [phone]  Spoke to Kristina  4 days approved.  Level of care: inpatient mental health   Review on pending admission   Authorization # pending admission

## 2025-04-18 NOTE — ED NOTES
"The patient is a 29 y/o M who was self-referred for auditory and visual hallucinations that have interfered with his ability to find work and to keep a job. He has a history of depression, social anxiety, ADD and psychosis. No history of self-injurous behavior. History of 1 suicide attempt in 2012 by overdose of medication. Patient presents as guarded and hypervigilant. He is continuously glancing at the glass door to his room. Patient stated he has been having difficulty sleeping and decreased appetite. \"It isn't even a thought to get something to eat.\" He has been moderately depressed with no current suicidal thoughts. He stated it has been difficult to go into the community and to function, as he is not comfortable around people. Patient stated he cannot discern if what he is seeing, hearing and thinking is due to his mental health or is reality. \"I can't tell the difference.\" He describes inability to concentrate, feeling anxious and feeling guilty because he is unemployed. He has been caring for himself minimally due to poor motivation and decreased energy. He stated he has been on Lexapro in the past but it made him nervous. He had also been on Seroquel in the past and this was also ineffective. Patient is on a waiting list for  outpatient psychiatry. He had been referred to [facility] in the past, delayed by insurance issues, then did not follow through. The patient stated he has no history of violence. He recalls no abuse or trauma in the past. He has no current legal involvement. He stated he had a history of experimental drug use in his teens and 20s. He does not drink alcohol or use illicit drugs. Patient does require an inpatient admission at this time due to inability to function at his usual baseline due to poor reality-testing.   "

## 2025-04-18 NOTE — ED NOTES
[place]    Eligibility Verification System checked - ([phone]).  Online system / automated system indicates: active    ID # [id number]      [place] [facility]  Information Contact  Telephone: [phone]

## 2025-04-18 NOTE — ED PROVIDER NOTES
Time reflects when diagnosis was documented in both MDM as applicable and the Disposition within this note       Time User Action Codes Description Comment    4/18/2025 10:03 AM Jake Young [Z00.8] Encounter for psychological evaluation     4/18/2025 10:04 AM Jake Young [R44.0] Auditory hallucinations     4/18/2025 10:04 AM Jake Young [R44.1] Visual hallucinations     4/18/2025  1:37 PM Jake Carr [Z00.8] Medical clearance for psychiatric admission           ED Disposition       ED Disposition   Transfer to Behavioral [facility]    Condition   --    Date/Time   Fri Apr 18, 2025 10:04 AM    Comment   Leann Mcnair should be transferred out to Behavioral health and has been medically cleared.               Assessment & Plan       Medical Decision Making  30-year-old male presents the ED for psychiatric evaluation.  Reports auditory and visual hallucinations, ongoing but worsening over the last few months.  He is on Seroquel, states it has not been helping, states hallucinations are to the point that they are affecting his ADLs.  He denies any SI, HI, or thoughts on himself or others.  States he would like to go inpatient for psych.  Patient evaluated by crisis, 201 signed.    Amount and/or Complexity of Data Reviewed  Labs: ordered.    Risk  Prescription drug management.  Decision regarding hospitalization.        ED Course as of 04/18/25 1510   Fri Apr 18, 2025   1416 201 signed.       Medications   LORazepam (ATIVAN) tablet 1 mg (1 mg Oral Given 4/18/25 1142)       ED Risk Strat Scores                    No data recorded        SBIRT 20yo+      Flowsheet Row Most Recent Value   Initial Alcohol Screen: US AUDIT-C     1. How often do you have a drink containing alcohol? 0 Filed at: 04/18/2025 1045   2. How many drinks containing alcohol do you have on a typical day you are drinking?  0 Filed at: 04/18/2025 1045   3a. Male UNDER 65: How often do you have five or more drinks on one occasion? 0  Filed at: 04/18/2025 1045   Audit-C Score 0 Filed at: 04/18/2025 1045   EDUARDO: How many times in the past year have you...    Used an illegal drug or used a prescription medication for non-medical reasons? Never Filed at: 04/18/2025 1045                            History of Present Illness       Chief Complaint   Patient presents with    Psychiatric Evaluation     Patient brought in by EMS for psychiatric evaluation. Patient endorses AH/VH. Denies SI/HI. States he has been dealing with it for 5 years but the medication isn't helping and he states it is getting worse and impacting his daily life and ability to work.       Past Medical History:   Diagnosis Date    ADHD     Adjustment disorder     Allergic     Anxiety     Asthma     Depression     Dysuria     Last assessed - 1/24/14    Hallucination     Psychosis (HCC)     Sleep difficulties     Substance abuse (HCC)     Suicide attempt (HCC)       History reviewed. No pertinent surgical history.   Family History   Problem Relation Age of Onset    RACHID disease Mother         Reflux    Other Mother         Rhinitis    No Known Problems Father     Allergies Cousin         Allergic Disorder    Asthma Cousin       Social History     Tobacco Use    Smoking status: Every Day     Current packs/day: 0.25     Average packs/day: 0.3 packs/day for 8.8 years (2.2 ttl pk-yrs)     Types: Cigarettes, Cigars     Start date: 6/27/2016    Smokeless tobacco: Never    Tobacco comments:     Black-a-mile   Vaping Use    Vaping status: Never Used   Substance Use Topics    Alcohol use: Yes     Alcohol/week: 2.0 standard drinks of alcohol     Types: 2 Cans of beer per week    Drug use: Not Currently     Types: Marijuana, Amphetamines, Methamphetamines     Comment: UDS negative 4/18/2025      E-Cigarette/Vaping    E-Cigarette Use Never User       E-Cigarette/Vaping Substances      I have reviewed and agree with the history as documented.     This is a 30 YOM with a medical history significant  "for asthma, anxiety, psych disorder who presents to the ED via EMS for psychiatric evaluation.  Patient reports ongoing auditory and visual hallucinations.  He states he has been present for approximately 5 years but have been worsening over the last few months.  Reports constant auditory hallucinations, states voices in my head are continuous.\"  He states \"I see negative things\" though denies command hallucinations states the voices have not told him to harm himself or harm others.  Does also report visual hallucinations.  States these hallucinations have been affecting his sleep as well as ADLs, states he has been unable to work due to this.  Patient is on Seroquel 100 mg, has been on this for several months, prescribed by his PCP.  States it is currently not helping.  Patient does report increasing anxiety due to these problems though he denies any SI, HI, or thoughts to harm himself or others.  He denies any other acute concerns or complaints at this time.      Psychiatric Evaluation  Presenting symptoms: hallucinations    Presenting symptoms: no self-mutilation and no suicidal thoughts    Associated symptoms: anxiety    Associated symptoms: no abdominal pain, no chest pain and no headaches        Review of Systems   Constitutional:  Negative for chills and fever.   Respiratory:  Negative for cough and shortness of breath.    Cardiovascular:  Negative for chest pain.   Gastrointestinal:  Negative for abdominal pain, nausea and vomiting.   Genitourinary:  Negative for difficulty urinating.   Musculoskeletal:  Negative for arthralgias.   Neurological:  Negative for light-headedness and headaches.   Psychiatric/Behavioral:  Positive for decreased concentration, hallucinations and sleep disturbance. Negative for self-injury and suicidal ideas. The patient is nervous/anxious.            Objective       ED Triage Vitals [04/18/25 0914]   Temperature Pulse Blood Pressure Respirations SpO2 Patient Position - Orthostatic " VS   97.9 °F (36.6 °C) 88 132/78 16 100 % Lying      Temp Source Heart Rate Source BP Location FiO2 (%) Pain Score    Oral Monitor Right arm -- --      Vitals      Date and Time Temp Pulse SpO2 Resp BP Pain Score FACES Pain Rating User   04/18/25 1200 -- 89 98 % 18 136/89 -- -- TS   04/18/25 0914 97.9 °F (36.6 °C) 88 100 % 16 132/78 -- -- KE            Physical Exam  Vitals and nursing note reviewed.   Constitutional:       General: He is not in acute distress.     Appearance: He is well-developed.   HENT:      Head: Normocephalic and atraumatic.   Eyes:      Conjunctiva/sclera: Conjunctivae normal.   Cardiovascular:      Rate and Rhythm: Normal rate and regular rhythm.      Heart sounds: No murmur heard.  Pulmonary:      Effort: Pulmonary effort is normal. No respiratory distress.      Breath sounds: Normal breath sounds.   Abdominal:      Palpations: Abdomen is soft.      Tenderness: There is no abdominal tenderness.   Musculoskeletal:         General: No swelling.      Cervical back: Normal range of motion and neck supple.   Skin:     General: Skin is warm and dry.      Capillary Refill: Capillary refill takes less than 2 seconds.   Neurological:      General: No focal deficit present.      Mental Status: He is alert and oriented to person, place, and time.   Psychiatric:         Attention and Perception: He perceives auditory and visual hallucinations.         Mood and Affect: Mood normal.         Behavior: Behavior is cooperative.         Thought Content: Thought content does not include homicidal or suicidal ideation. Thought content does not include homicidal or suicidal plan.         Results Reviewed       Procedure Component Value Units Date/Time    Rapid drug screen, urine [[id number]]  (Normal) Collected: 04/18/25 1009    Lab Status: Final result Specimen: Urine, Other Updated: 04/18/25 1036     Amph/Meth UR Negative     Barbiturate Ur Negative     Benzodiazepine Urine Negative     Cocaine Urine Negative      Methadone Urine Negative     Opiate Urine Negative     PCP Ur Negative     THC Urine Negative     Oxycodone Urine Negative     Fentanyl Urine Negative     HYDROCODONE URINE Negative    Narrative:      FOR MEDICAL PURPOSES ONLY.   IF CONFIRMATION NEEDED PLEASE CONTACT THE LAB WITHIN 5 DAYS.    Drug Screen Cutoff Levels:  AMPHETAMINE/METHAMPHETAMINES  1000 ng/mL  BARBITURATES     200 ng/mL  BENZODIAZEPINES     200 ng/mL  COCAINE      300 ng/mL  METHADONE      300 ng/mL  OPIATES      300 ng/mL  PHENCYCLIDINE     25 ng/mL  THC       50 ng/mL  OXYCODONE      100 ng/mL  FENTANYL      5 ng/mL  HYDROCODONE     300 ng/mL    POCT alcohol breath test [[id number]]  (Normal) Resulted: 04/18/25 1002    Lab Status: Final result Updated: 04/18/25 1002     EXTBreath Alcohol 0.000            No orders to display       Procedures    ED Medication and Procedure Management   Prior to Admission Medications   Prescriptions Last Dose Informant Patient Reported? Taking?   dicyclomine (BENTYL) 20 mg tablet Not Taking  No No   Sig: Take 1 tablet (20 mg total) by mouth 2 (two) times a day   Patient not taking: Reported on 4/18/2025   doxepin (SINEquan) 10 mg capsule Not Taking  No No   Sig: take 1 capsule by mouth at bedtime   Patient not taking: Reported on 4/18/2025   lisinopril (ZESTRIL) 5 mg tablet 4/18/2025 Morning  Yes Yes   Sig: Take 10 mg by mouth daily   ondansetron (ZOFRAN) 4 mg tablet Not Taking  No No   Sig: Take 1 tablet (4 mg total) by mouth every 6 (six) hours   Patient not taking: Reported on 4/18/2025   traZODone (DESYREL) 50 mg tablet Past Month  Yes Yes   Sig: Take 50 mg by mouth daily at bedtime      Facility-Administered Medications: None     Patient's Medications   Discharge Prescriptions    No medications on file     No discharge procedures on file.  ED SEPSIS DOCUMENTATION   Time reflects when diagnosis was documented in both MDM as applicable and the Disposition within this note       Time User Action Codes  Description Comment    4/18/2025 10:03 AM Jake Young [Z00.8] Encounter for psychological evaluation     4/18/2025 10:04 AM Jake Young [R44.0] Auditory hallucinations     4/18/2025 10:04 AM Jake Young [R44.1] Visual hallucinations     4/18/2025  1:37 PM Jake Carr [Z00.8] Medical clearance for psychiatric admission                  Jake Young PA-C  04/18/25 8240

## 2025-04-18 NOTE — EMTALA/ACUTE CARE TRANSFER
[facility] EMERGENCY DEPARTMENT  [address] [address]  Dept: [phone]      EMTALA TRANSFER CONSENT    NAME Leann DAVIS 1995                              MRN [id number]    I have been informed of my rights regarding examination, treatment, and transfer   by Dr. Pb Bliss MD    Benefits: Continuity of care    Risks: Potential for delay in receiving treatment, Potential deterioration of medical condition      Consent for Transfer:  I acknowledge that my medical condition has been evaluated and explained to me by the emergency department physician or other qualified medical person and/or my attending physician, who has recommended that I be transferred to the service of  Accepting Physician: Jean Castellano MD at Accepting Facility Name, City & State : [facility], [address] [place] [address] [place] [address]. The above potential benefits of such transfer, the potential risks associated with such transfer, and the probable risks of not being transferred have been explained to me, and I fully understand them.  The doctor has explained that, in my case, the benefits of transfer outweigh the risks.  I agree to be transferred.    I authorize the performance of emergency medical procedures and treatments upon me in both transit and upon arrival at the receiving facility.  Additionally, I authorize the release of any and all medical records to the receiving facility and request they be transported with me, if possible.  I understand that the safest mode of transportation during a medical emergency is an ambulance and that the Hospital advocates the use of this mode of transport. Risks of traveling to the receiving facility by car, including absence of medical control, life sustaining equipment, such as oxygen, and medical personnel has been explained to me and I fully understand them.    (STEF CORRECT BOX BELOW)  [  ]  I consent to the stated  transfer and to be transported by ambulance/helicopter.  [  ]  I consent to the stated transfer, but refuse transportation by ambulance and accept full responsibility for my transportation by car.  I understand the risks of non-ambulance transfers and I exonerate the Hospital and its staff from any deterioration in my condition that results from this refusal.    X___________________________________________    DATE  25  TIME________  Signature of patient or legally responsible individual signing on patient behalf           RELATIONSHIP TO PATIENT_________________________          Provider Certification    NAME Leann Mcnair                                         1995                              MRN [id number]    A medical screening exam was performed on the above named patient.  Based on the examination:    Condition Necessitating Transfer The primary encounter diagnosis was Encounter for psychological evaluation. Diagnoses of Auditory hallucinations, Visual hallucinations, and Medical clearance for psychiatric admission were also pertinent to this visit.    Patient Condition: The patient has been stabilized such that within reasonable medical probability, no material deterioration of the patient condition or the condition of the unborn child(kika) is likely to result from the transfer    Reason for Transfer: Level of Care needed not available at this facility    Transfer Requirements: [facility], [address] [place] [address]   Space available and qualified personnel available for treatment as acknowledged by Ritu Green/ [phone]  Agreed to accept transfer and to provide appropriate medical treatment as acknowledged by       Jean Castellano MD  Appropriate medical records of the examination and treatment of the patient are provided at the time of transfer   STAFF INITIAL WHEN COMPLETED _______  Transfer will be performed by qualified personnel from    and appropriate  transfer equipment as required, including the use of necessary and appropriate life support measures.    Provider Certification: I have examined the patient and explained the following risks and benefits of being transferred/refusing transfer to the patient/family:  General risk, such as traffic hazards, adverse weather conditions, rough terrain or turbulence, possible failure of equipment (including vehicle or aircraft), or consequences of actions of persons outside the control of the transport personnel, The possibility of a transport vehicle being unavailable      Based on these reasonable risks and benefits to the patient and/or the unborn child(kika), and based upon the information available at the time of the patient’s examination, I certify that the medical benefits reasonably to be expected from the provision of appropriate medical treatments at another medical facility outweigh the increasing risks, if any, to the individual’s medical condition, and in the case of labor to the unborn child, from effecting the transfer.    X____________________________________________ DATE 04/18/25        TIME_______      ORIGINAL - SEND TO MEDICAL RECORDS   COPY - SEND WITH PATIENT DURING TRANSFER

## 2025-04-19 PROBLEM — I10 HTN (HYPERTENSION): Status: ACTIVE | Noted: 2025-04-19

## 2025-04-19 PROBLEM — Z00.8 MEDICAL CLEARANCE FOR PSYCHIATRIC ADMISSION: Status: ACTIVE | Noted: 2018-02-15

## 2025-04-19 LAB
25(OH)D3 SERPL-MCNC: <7 NG/ML (ref 30–100)
ALBUMIN SERPL BCG-MCNC: 4.2 G/DL (ref 3.5–5)
ALP SERPL-CCNC: 42 U/L (ref 34–104)
ALT SERPL W P-5'-P-CCNC: 15 U/L (ref 7–52)
ANION GAP SERPL CALCULATED.3IONS-SCNC: 6 MMOL/L (ref 4–13)
AST SERPL W P-5'-P-CCNC: 16 U/L (ref 13–39)
BASOPHILS # BLD AUTO: 0.07 THOUSANDS/ÂΜL (ref 0–0.1)
BASOPHILS NFR BLD AUTO: 1 % (ref 0–1)
BILIRUB SERPL-MCNC: 0.61 MG/DL (ref 0.2–1)
BUN SERPL-MCNC: 9 MG/DL (ref 5–25)
CALCIUM SERPL-MCNC: 9.5 MG/DL (ref 8.4–10.2)
CHLORIDE SERPL-SCNC: 104 MMOL/L (ref 96–108)
CHOLEST SERPL-MCNC: 143 MG/DL (ref ?–200)
CO2 SERPL-SCNC: 29 MMOL/L (ref 21–32)
CREAT SERPL-MCNC: 1.06 MG/DL (ref 0.6–1.3)
EOSINOPHIL # BLD AUTO: 0.39 THOUSAND/ÂΜL (ref 0–0.61)
EOSINOPHIL NFR BLD AUTO: 7 % (ref 0–6)
ERYTHROCYTE [DISTWIDTH] IN BLOOD BY AUTOMATED COUNT: 13 % (ref 11.6–15.1)
EST. AVERAGE GLUCOSE BLD GHB EST-MCNC: 111 MG/DL
FOLATE SERPL-MCNC: 12.1 NG/ML
GFR SERPL CREATININE-BSD FRML MDRD: 93 ML/MIN/1.73SQ M
GLUCOSE P FAST SERPL-MCNC: 92 MG/DL (ref 65–99)
GLUCOSE SERPL-MCNC: 92 MG/DL (ref 65–140)
HBA1C MFR BLD: 5.5 %
HCT VFR BLD AUTO: 45.3 % (ref 36.5–49.3)
HDLC SERPL-MCNC: 36 MG/DL
HGB BLD-MCNC: 15 G/DL (ref 12–17)
IMM GRANULOCYTES # BLD AUTO: 0.01 THOUSAND/UL (ref 0–0.2)
IMM GRANULOCYTES NFR BLD AUTO: 0 % (ref 0–2)
LDLC SERPL CALC-MCNC: 77 MG/DL (ref 0–100)
LYMPHOCYTES # BLD AUTO: 2.42 THOUSANDS/ÂΜL (ref 0.6–4.47)
LYMPHOCYTES NFR BLD AUTO: 41 % (ref 14–44)
MCH RBC QN AUTO: 28.5 PG (ref 26.8–34.3)
MCHC RBC AUTO-ENTMCNC: 33.1 G/DL (ref 31.4–37.4)
MCV RBC AUTO: 86 FL (ref 82–98)
MONOCYTES # BLD AUTO: 0.34 THOUSAND/ÂΜL (ref 0.17–1.22)
MONOCYTES NFR BLD AUTO: 6 % (ref 4–12)
NEUTROPHILS # BLD AUTO: 2.63 THOUSANDS/ÂΜL (ref 1.85–7.62)
NEUTS SEG NFR BLD AUTO: 45 % (ref 43–75)
NONHDLC SERPL-MCNC: 107 MG/DL
NRBC BLD AUTO-RTO: 0 /100 WBCS
PLATELET # BLD AUTO: 231 THOUSANDS/UL (ref 149–390)
PMV BLD AUTO: 12.6 FL (ref 8.9–12.7)
POTASSIUM SERPL-SCNC: 4 MMOL/L (ref 3.5–5.3)
PROT SERPL-MCNC: 7.2 G/DL (ref 6.4–8.4)
RBC # BLD AUTO: 5.27 MILLION/UL (ref 3.88–5.62)
SODIUM SERPL-SCNC: 139 MMOL/L (ref 135–147)
TREPONEMA PALLIDUM IGG+IGM AB [PRESENCE] IN SERUM OR PLASMA BY IMMUNOASSAY: NORMAL
TRIGL SERPL-MCNC: 151 MG/DL (ref ?–150)
TSH SERPL DL<=0.05 MIU/L-ACNC: 0.61 UIU/ML (ref 0.45–4.5)
VIT B12 SERPL-MCNC: 442 PG/ML (ref 180–914)
WBC # BLD AUTO: 5.86 THOUSAND/UL (ref 4.31–10.16)

## 2025-04-19 PROCEDURE — 99253 IP/OBS CNSLTJ NEW/EST LOW 45: CPT

## 2025-04-19 PROCEDURE — 82306 VITAMIN D 25 HYDROXY: CPT | Performed by: PHYSICIAN ASSISTANT

## 2025-04-19 PROCEDURE — 85025 COMPLETE CBC W/AUTO DIFF WBC: CPT | Performed by: PHYSICIAN ASSISTANT

## 2025-04-19 PROCEDURE — 86780 TREPONEMA PALLIDUM: CPT | Performed by: PHYSICIAN ASSISTANT

## 2025-04-19 PROCEDURE — 80053 COMPREHEN METABOLIC PANEL: CPT | Performed by: PHYSICIAN ASSISTANT

## 2025-04-19 PROCEDURE — 99222 1ST HOSP IP/OBS MODERATE 55: CPT | Performed by: STUDENT IN AN ORGANIZED HEALTH CARE EDUCATION/TRAINING PROGRAM

## 2025-04-19 PROCEDURE — 84443 ASSAY THYROID STIM HORMONE: CPT | Performed by: PHYSICIAN ASSISTANT

## 2025-04-19 PROCEDURE — 83036 HEMOGLOBIN GLYCOSYLATED A1C: CPT | Performed by: PHYSICIAN ASSISTANT

## 2025-04-19 PROCEDURE — 93005 ELECTROCARDIOGRAM TRACING: CPT

## 2025-04-19 PROCEDURE — 80061 LIPID PANEL: CPT | Performed by: PHYSICIAN ASSISTANT

## 2025-04-19 PROCEDURE — 82607 VITAMIN B-12: CPT | Performed by: PHYSICIAN ASSISTANT

## 2025-04-19 PROCEDURE — 82746 ASSAY OF FOLIC ACID SERUM: CPT | Performed by: PHYSICIAN ASSISTANT

## 2025-04-19 RX ORDER — LISINOPRIL 10 MG/1
10 TABLET ORAL DAILY
Status: DISCONTINUED | OUTPATIENT
Start: 2025-04-20 | End: 2025-04-25 | Stop reason: HOSPADM

## 2025-04-19 RX ORDER — OLANZAPINE 5 MG/1
5 TABLET, FILM COATED ORAL 2 TIMES DAILY
Status: DISCONTINUED | OUTPATIENT
Start: 2025-04-19 | End: 2025-04-20

## 2025-04-19 RX ADMIN — OLANZAPINE 5 MG: 5 TABLET, FILM COATED ORAL at 10:03

## 2025-04-19 RX ADMIN — OLANZAPINE 5 MG: 5 TABLET, FILM COATED ORAL at 16:17

## 2025-04-19 RX ADMIN — OLANZAPINE 10 MG: 10 TABLET, FILM COATED ORAL at 20:50

## 2025-04-19 RX ADMIN — OLANZAPINE 5 MG: 5 TABLET, FILM COATED ORAL at 17:58

## 2025-04-19 RX ADMIN — HYDROXYZINE HYDROCHLORIDE 50 MG: 50 TABLET, FILM COATED ORAL at 11:54

## 2025-04-19 RX ADMIN — NICOTINE 1 PATCH: 21 PATCH, EXTENDED RELEASE TRANSDERMAL at 09:08

## 2025-04-19 NOTE — NURSING NOTE
Pt napping for log periods of time c/o anxiety medicated as directed denies SI AVH at this time scant conversation, will continue to monitor activity

## 2025-04-19 NOTE — PLAN OF CARE
Problem: Alteration in Thoughts and Perception  Goal: Treatment Goal: Gain control of psychotic behaviors/thinking, reduce/eliminate presenting symptoms and demonstrate improved reality functioning upon discharge  Outcome: Progressing     Problem: Depression  Goal: Treatment Goal: Demonstrate behavioral control of depressive symptoms, verbalize feelings of improved mood/affect, and adopt new coping skills prior to discharge  Outcome: Progressing     Problem: Anxiety  Goal: Anxiety is at manageable level  Description: Interventions:- Assess and monitor patient's anxiety level. - Monitor for signs and symptoms (heart palpitations, chest pain, shortness of breath, headaches, nausea, feeling jumpy, restlessness, irritable, apprehensive). - Collaborate with interdisciplinary team and initiate plan and interventions as ordered.- [place] patient to unit/surroundings- Explain treatment plan- Encourage participation in care- Encourage verbalization of concerns/fears- Identify coping mechanisms- Assist in developing anxiety-reducing skills- Administer/offer alternative therapies- Limit or eliminate stimulants  Outcome: Progressing     Problem: DISCHARGE PLANNING  Goal: Discharge to home or other facility with appropriate resources  Description: INTERVENTIONS:- Identify barriers to discharge w/patient and caregiver- Arrange for needed discharge resources and transportation as appropriate- Identify discharge learning needs (meds, wound care, etc.)- Arrange for interpretive services to assist at discharge as needed- Refer to Case Management Department for coordinating discharge planning if the patient needs post-hospital services based on physician/advanced practitioner order or complex needs related to functional status, cognitive ability, or social support system  Outcome: Progressing

## 2025-04-19 NOTE — NURSING NOTE
Pt approached medication and requested medication to facilitate sleep. Pt received trazodone 50 mg @2116. Upon follow up pt is resting in bed with eyes closed, appears to be asleep. Medication effective.

## 2025-04-19 NOTE — NURSING NOTE
At 11:54 RN administered Atarax 50 mg PO for moderate anxiety (Tabares= 21). Upon follow-up, medication appears effective as evidenced by patient calmly resting in room in no signs of distress.

## 2025-04-19 NOTE — ASSESSMENT & PLAN NOTE
Admission labs reviewed, CBC, CMP, lipid panel, TSH acceptable  RPR, HbA1c, vitamin levels pending  Vitals stable   UDS negative  EKG: NSR,   Medically stable for continued inpatient psychiatric treatment based on available results

## 2025-04-19 NOTE — ASSESSMENT & PLAN NOTE
30-year-old man with history of depression, anxiety, psychotic episodes and inpatient hospitalization for suicide attempt in 2012 presents with auditory and visual hallucinations that are worsening and impacting his ability to function.  Patient received as needed Zyprexa with good effect last night and is amenable to a trial of standing Zyprexa.  Initiate Zyprexa 5 mg twice daily for psychosis.  Reports depressed mood and poor sleep along with low energy, poor concentration.  Attributes this to his hallucinations.  We will initiate antipsychotic treatment and monitor for improvement in psychotic symptoms and mood.    -- Initiate Zyprexa 5 mg twice daily for psychosis

## 2025-04-19 NOTE — CMS CERTIFICATION NOTE
Recertification: Based upon physical, mental and social evaluations, I certify that inpatient psychiatric services continue to be medically necessary for this patient for a duration of 10 midnights for the treatment of  Psychotic disorder (HCC) Available alternative community resources still do not meet the patient's mental health care needs. I further attest that an established written individualized plan of care has been updated and is outlined in the patient's medical records.

## 2025-04-19 NOTE — H&P
Psychiatric Evaluation - Behavioral Health   Name: Leann Mcnair 30 y.o. male I MRN: [id number]  Unit/Bed#: -01 I Date of Admission: 4/18/2025   Date of Service: 4/19/2025 I Hospital Day: 1    Assessment & Plan  Psychotic disorder (HCC)  30-year-old man with history of depression, anxiety, psychotic episodes and inpatient hospitalization for suicide attempt in 2012 presents with auditory and visual hallucinations that are worsening and impacting his ability to function.  Patient received as needed Zyprexa with good effect last night and is amenable to a trial of standing Zyprexa.  Initiate Zyprexa 5 mg twice daily for psychosis.  Reports depressed mood and poor sleep along with low energy, poor concentration.  Attributes this to his hallucinations.  We will initiate antipsychotic treatment and monitor for improvement in psychotic symptoms and mood.    -- Initiate Zyprexa 5 mg twice daily for psychosis      Current Facility-Administered Medications   Medication Dose Route Frequency Provider Last Rate    acetaminophen  650 mg Oral Q6H PRN Jake Carr, PA-LOGAN      acetaminophen  650 mg Oral Q4H PRN Jake Carr, PA-LOGAN      acetaminophen  975 mg Oral Q6H PRN Jkae Carr, PA-LOGAN      aluminum-magnesium hydroxide-simethicone  30 mL Oral Q4H PRN Jake Carr, PA-LOGAN      artificial tear   Both Eyes 4x Daily PRN Jake Carr, PA-LOGAN      benztropine  1 mg Intramuscular BID PRN Jake Carr, PA-LOGAN      benztropine  1 mg Oral BID PRN Jake Carr, PA-LOGAN      bisacodyl  10 mg Rectal Daily PRN Jake Carr, PA-LOGAN      hydrOXYzine HCL  50 mg Oral Q6H PRN Max 4/day RICCO Vasquez-LOGAN      Or    diphenhydrAMINE  50 mg Intramuscular Q6H PRN Jake Carr, PA-LOGAN      hydrOXYzine HCL  100 mg Oral Q6H PRN Max 4/day Jake Carr PA-C      Or    LORazepam  2 mg Intramuscular Q6H PRN Jake Carr, PA-LOGAN      hydrOXYzine HCL  25 mg Oral Q6H PRN Max 4/day Jake Carr, PA-LOGAN      magnesium hydroxide  30 mL  "Oral Daily PRN Jake GONZALES Lilly, MARVA      nicotine  1 patch Transdermal Daily Jake GONZALES Lilly, MARVA      nicotine polacrilex  4 mg Oral Q2H PRN Jake GONZALES Lilly, MARVA      OLANZapine  10 mg Oral Q3H PRN Max 3/day Jake S Lilly, MARVA      Or    OLANZapine  10 mg Intramuscular Q3H PRN Max 3/day Jake GONZALES Lilly, PA-LOGAN      OLANZapine  5 mg Oral Q3H PRN Max 6/day Jake GONZALES Lilly, PA-LOGAN      Or    OLANZapine  5 mg Intramuscular Q3H PRN Max 6/day Jake GONZALES Lilly, PA-LOGAN      OLANZapine  2.5 mg Oral Q3H PRN Max 8/day Jake GONZALES Lilly, MARVA      OLANZapine  5 mg Oral BID Laureano Aburto MD      propranolol  10 mg Oral Q8H PRN Jake GONZALES MARVA Carr      senna-docusate sodium  1 tablet Oral Daily PRN Jake Craigmore, MARVA      traZODone  50 mg Oral HS PRN Jake GONZALES MARVA Carr         Risks/Benefits of Treatment:     Risks, benefits, and possible side effects of medications explained to patient and patient verbalizes understanding and agreement for treatment.    Treatment Planning:     All current active medications have been reviewed.  Continue to monitor response to treatment and assess for potential side effects of medications.  Encourage group therapy, milieu therapy and occupational therapy  Collaboration with medical service for medical comorbidities as indicated.  Behavioral Health checks for safety monitoring.  Estimated Discharge Day:     Psychiatric Evaluation    Chief Complaint: \"I have been hearing and seeing things\"    History of Present Illness     Per ED provider note:30-year-old male presents the ED for psychiatric evaluation. Reports auditory and visual hallucinations, ongoing but worsening over the last few months. He is on Seroquel, states it has not been helping, states hallucinations are to the point that they are affecting his ADLs. He denies any SI, HI, or thoughts on himself or others. States he would like to go inpatient for psych. Patient evaluated by crisis, 201 signed.     Per Crisis worker " "note:The patient is a 31 y/o M who was self-referred for auditory and visual hallucinations that have interfered with his ability to find work and to keep a job. He has a history of depression, social anxiety, ADD and psychosis. No history of self-injurous behavior. History of 1 suicide attempt in 2012 by overdose of medication. Patient presents as guarded and hypervigilant. He is continuously glancing at the glass door to his room. Patient stated he has been having difficulty sleeping and decreased appetite. \"It isn't even a thought to get something to eat.\" He has been moderately depressed with no current suicidal thoughts. He stated it has been difficult to go into the community and to function, as he is not comfortable around people. Patient stated he cannot discern if what he is seeing, hearing and thinking is due to his mental health or is reality. \"I can't tell the difference.\" He describes inability to concentrate, feeling anxious and feeling guilty because he is unemployed. He has been caring for himself minimally due to poor motivation and decreased energy. He stated he has been on Lexapro in the past but it made him nervous. He had also been on Seroquel in the past and this was also ineffective. Patient is on a waiting list for  outpatient psychiatry. He had been referred to [facility] in the past, delayed by insurance issues, then did not follow through. The patient stated he has no history of violence. He recalls no abuse or trauma in the past. He has no current legal involvement. He stated he had a history of experimental drug use in his teens and 20s. He does not drink alcohol or use illicit drugs. Patient does require an inpatient admission at this time due to inability to function at his usual baseline due to poor reality-testing.     On admission to Inpatient Psychiatric Unit:  Patient was generally calm and cooperative with interview.  He reports that he has been struggling with auditory and " visual hallucinations.  Reports that the voices say derogatory things and have been bothersome to him.  He reports that he has been on Seroquel in the outpatient setting but this has not been helpful for him.  He reports his mood is depressed and he has been having trouble sleeping because of his hallucinations.  Reports that his hallucinations are affecting his daily life and functioning.  He is not on any current medications besides Seroquel which he does not feel has been effective for him.  Amenable to medications for ongoing hallucinations.  He received as needed Zyprexa which she feels was effective and we discussed trialing Zyprexa as a standing medication after discussion of risks and benefits.  He was amenable to starting Zyprexa and monitoring for improvement in hallucinations.    Psychiatric Review Of Systems:  Medication side effects: none  Sleep: poor  Appetite: no change  Hygiene: able to tend to instrumental and basic ADLs  Anxiety Symptoms: denies  Psychotic Symptoms: endorses AVH  Depression Symptoms: endorses depressed mood  Manic Symptoms: denies  PTSD Symptoms: denies  Suicidal Thoughts: denies  Homicidal Thoughts: denies    Historical Information     Past Psychiatric History:   Inpatient Treatment: multiple  Outpatient Treatment: none  Past Suicide Attempts: 1 prior suicide attempt by overdose in 2012  Past Violent Behavior: Pertinent hx documented as per HPI  Past Psychiatric Medication Trials: seroquel, Lexapro    Substance Abuse History:  Social History     Substance and Sexual Activity   Alcohol Use Yes    Alcohol/week: 2.0 standard drinks of alcohol    Types: 2 Cans of beer per week    Comment: socially     Social History     Substance and Sexual Activity   Drug Use Not Currently    Types: Marijuana, Amphetamines, Methamphetamines    Comment: UDS negative 4/18/2025       I have assessed this patient for substance use within the past 12 months.    Family Psychiatric History:  Family History    Problem Relation Age of Onset    RACHID disease Mother         Reflux    Other Mother         Rhinitis    No Known Problems Father     Allergies Cousin         Allergic Disorder    Asthma Cousin        Social History:    Rest of social history as per below:    Social History     Socioeconomic History    Marital status: Single     Spouse name: Not on file    Number of children: Not on file    Years of education: Not on file    Highest education level: Not on file   Occupational History    Occupation: Un[employer]      Comment: Employed Full Time   Tobacco Use    Smoking status: Former     Current packs/day: 0.25     Average packs/day: 0.3 packs/day for 8.8 years (2.2 ttl pk-yrs)     Types: Cigarettes, Cigars     Start date: 6/27/2016    Smokeless tobacco: Never   Vaping Use    Vaping status: Never Used   Substance and Sexual Activity    Alcohol use: Yes     Alcohol/week: 2.0 standard drinks of alcohol     Types: 2 Cans of beer per week     Comment: socially    Drug use: Not Currently     Types: Marijuana, Amphetamines, Methamphetamines     Comment: UDS negative 4/18/2025    Sexual activity: Not Currently     Partners: Female     Birth control/protection: Condom Male   Other Topics Concern    Not on file   Social History Narrative    Not on file     Social Drivers of Health     Financial Resource Strain: Not on file   Food Insecurity: No Food Insecurity (4/18/2025)    Nursing - Inadequate Food Risk Classification     Worried About Running Out of Food in the Last Year: Not on file     Ran Out of Food in the Last Year: Not on file     Ran Out of Food in the Last Year: Never true   Transportation Needs: No Transportation Needs (4/18/2025)    Nursing - Transportation Risk Classification     Lack of Transportation: Not on file     Lack of Transportation: No   Physical Activity: Not on file   Stress: Not on file   Social Connections: Not on file   Intimate Partner Violence: Unknown (4/18/2025)    Nursing IPS     Feels Physically and  Emotionally Safe: Not on file     Physically Hurt by Someone: Not on file     Humiliated or Emotionally Abused by Someone: Not on file     Physically Hurt by Someone: No     Hurt or Threatened by Someone: No   Housing Stability: Unknown (2025)    Nursing: Inadequate Housing Risk Classification     Has Housing: Not on file     Worried About Losing Housing: Not on file     Unable to Get Utilities: Not on file     Unable to Pay for Housing in the Last Year: No     Has Housin       Traumatic History:  Pertinent hx documented as per HPI    Past Medical History:  Past Medical History:   Diagnosis Date    ADHD     Adjustment disorder     Allergic     Anxiety     Asthma     Depression     Dysuria     Last assessed - 14    Hallucination     Psychosis (HCC)     Sleep difficulties     Substance abuse (HCC)     Suicide attempt (HCC)         Vital signs in last 24 hours:    Temp:  [98 °F (36.7 °C)-98.1 °F (36.7 °C)] 98 °F (36.7 °C)  HR:  [59-89] 59  BP: (126-161)/() 126/76  Resp:  [16-18] 16  SpO2:  [98 %-100 %] 99 %  O2 Device: None (Room air)    Mental Status Evaluation:    Appearance: disheveled, appears consistent with stated age  Motor: no psychomotor disturbances, no gait abnormalities  Behavior: superficially cooperative  Speech: normal rate and rhythm  Mood: depressed  Affect: blunted  Thought Process: concrete  Thought Content: denies delusions  Risk Potential: denies suicidal ideation, plan, or intent. Denies homicidal ideation  Perceptions: +IP/-RIS, endorses auditory hallucinations, denies visual hallucinations  Sensorium: Oriented to person, place, time, and situation  Cognition: cognitive ability appears intact but was not quantitatively tested  Consciousness: alert and awake  Attention: intact  Insight: limited  Judgement: limited      Patient Strengths/Assets: negotiates basic needs    Patient Barriers/Limitations: biopsychosocial stressors contributing to psychiatric decompensation    Lab  Results: I have reviewed the following results:  Recent Results (from the past 48 hours)   POCT alcohol breath test    Collection Time: 04/18/25 10:02 AM   Result Value Ref Range    EXTBreath Alcohol 0.000    Rapid drug screen, urine    Collection Time: 04/18/25 10:09 AM   Result Value Ref Range    Amph/Meth UR Negative Negative    Barbiturate Ur Negative Negative    Benzodiazepine Urine Negative Negative    Cocaine Urine Negative Negative    Methadone Urine Negative Negative    Opiate Urine Negative Negative    PCP Ur Negative Negative    THC Urine Negative Negative    Oxycodone Urine Negative Negative    Fentanyl Urine Negative Negative    HYDROCODONE URINE Negative Negative   Comprehensive metabolic panel    Collection Time: 04/19/25  6:02 AM   Result Value Ref Range    Sodium 139 135 - 147 mmol/L    Potassium 4.0 3.5 - 5.3 mmol/L    Chloride 104 96 - 108 mmol/L    CO2 29 21 - 32 mmol/L    ANION GAP 6 4 - 13 mmol/L    BUN 9 5 - 25 mg/dL    Creatinine 1.06 0.60 - 1.30 mg/dL    Glucose 92 65 - 140 mg/dL    Glucose, Fasting 92 65 - 99 mg/dL    Calcium 9.5 8.4 - 10.2 mg/dL    AST 16 13 - 39 U/L    ALT 15 7 - 52 U/L    Alkaline Phosphatase 42 34 - 104 U/L    Total Protein 7.2 6.4 - 8.4 g/dL    Albumin 4.2 3.5 - 5.0 g/dL    Total Bilirubin 0.61 0.20 - 1.00 mg/dL    eGFR 93 ml/min/1.73sq m   CBC and differential    Collection Time: 04/19/25  6:02 AM   Result Value Ref Range    WBC 5.86 4.31 - 10.16 Thousand/uL    RBC 5.27 3.88 - 5.62 Million/uL    Hemoglobin 15.0 12.0 - 17.0 g/dL    Hematocrit 45.3 36.5 - 49.3 %    MCV 86 82 - 98 fL    MCH 28.5 26.8 - 34.3 pg    MCHC 33.1 31.4 - 37.4 g/dL    RDW 13.0 11.6 - 15.1 %    MPV 12.6 8.9 - 12.7 fL    Platelets 231 149 - 390 Thousands/uL    nRBC 0 /100 WBCs    Segmented % 45 43 - 75 %    Immature Grans % 0 0 - 2 %    Lymphocytes % 41 14 - 44 %    Monocytes % 6 4 - 12 %    Eosinophils Relative 7 (H) 0 - 6 %    Basophils Relative 1 0 - 1 %    Absolute Neutrophils 2.63 1.85 - 7.62  Thousands/µL    Absolute Immature Grans 0.01 0.00 - 0.20 Thousand/uL    Absolute Lymphocytes 2.42 0.60 - 4.47 Thousands/µL    Absolute Monocytes 0.34 0.17 - 1.22 Thousand/µL    Eosinophils Absolute 0.39 0.00 - 0.61 Thousand/µL    Basophils Absolute 0.07 0.00 - 0.10 Thousands/µL   Lipid panel    Collection Time: 04/19/25  6:02 AM   Result Value Ref Range    Cholesterol 143 See Comment mg/dL    Triglycerides 151 (H) See Comment mg/dL    HDL, Direct 36 (L) >=40 mg/dL    LDL Calculated 77 0 - 100 mg/dL    Non-HDL-Chol (CHOL-HDL) 107 mg/dl   TSH, 3rd generation with Free T4 reflex    Collection Time: 04/19/25  6:02 AM   Result Value Ref Range    TSH 3RD GENERATON 0.607 0.450 - 4.500 uIU/mL        Code Status: Level 1 - Full Code  Advance Directive and Living Will: <no information>  Next of Kin: Extended Emergency Contact Information  Primary Emergency Contact: Gemini Clayton  Home Phone: [phone]  Relation: Mother    Counseling / Coordination of Care:   Patient's progress discussed with staff in treatment team meeting.  Medication changes reviewed with staff in treatment team meeting..    Inpatient Psychiatric Certification:  Based upon physical, mental, and social evaluations, I certify that inpatient psychiatric services are medically necessary for this patient for a duration of 5-7 midnights (exact duration TBD pending patient's response to psychiatric treatment) for the diagnosis listed above.  Available alternative community resources do not meet the patient's mental health care needs.  I further attest that an established written individualized plan of care has been implemented and is outlined in the patient's medical records.    This note has been constructed using a voice recognition system. There may be translation, syntax, or grammatical errors. If you have any questions, please contact the dictating provider.

## 2025-04-19 NOTE — CONSULTS
Consultation - Hospitalist   Name: Leann Mcnair 30 y.o. male I MRN: [id number]  Unit/Bed#: -01 I Date of Admission: 4/18/2025   Date of Service: 4/19/2025 I Hospital Day: 1   Inpatient consult for Medical Clearance for  patient  Consult performed by: Alondra Hawkins PA-C  Consult ordered by: Jake Carr PA-C        Physician Requesting Evaluation: Pito Hernandez*   Reason for Evaluation / Principal Problem: medical clearance for psychiatric admission    Assessment & Plan  Psychotic disorder (HCC)    Medical clearance for psychiatric admission  Admission labs reviewed, CBC, CMP, lipid panel, TSH acceptable  RPR, HbA1c, vitamin levels pending  Vitals stable   UDS negative  EKG: NSR,   Medically stable for continued inpatient psychiatric treatment based on available results  HTN (hypertension)  Continue home lisinopril 10 mg daily      History of Present Illness   Chief Complaint: Medical clearance for psychiatric admission    Leann Mcnair is a 30 y.o. male with a PMH of hypertension who presents with with auditory and visual hallucinations worsening over the past several months.  Patient is admitted via [facility].  We are consulted for assistance with medical clearance for psychiatric admission.    Discussed patient's home medications.  He endorses taking Seroquel, lisinopril 10 mg daily and intermittently taking doxepin however is not always compliant with this.  He denies additional blood pressure medications.  He denies any additional known cardiac history or any additional known medical conditions.  Patient does endorse using tobacco several times a week however denies additional frequent substance or alcohol use.  Patient denies any physical complaints today including fevers chills, cough, sore throat, difficulty breathing, chest pain, GI symptoms, or any additional complaints.  Labs and vitals reviewed.  Based on all available data patient is medically stable to continue  inpatient psychiatric treatment.    Review of Systems   Constitutional:  Negative for chills, fatigue and fever.   HENT:  Negative for congestion, rhinorrhea and sore throat.    Eyes:  Negative for visual disturbance.   Respiratory:  Negative for cough, chest tightness, shortness of breath and wheezing.    Cardiovascular:  Negative for chest pain, palpitations and leg swelling.   Gastrointestinal:  Negative for abdominal pain, constipation, diarrhea, nausea and vomiting.   Genitourinary:  Negative for difficulty urinating, dysuria and frequency.   Musculoskeletal:  Negative for arthralgias and myalgias.   Skin:  Negative for rash and wound.   Neurological:  Negative for dizziness, light-headedness and headaches.   Psychiatric/Behavioral:  Positive for hallucinations.    All other systems reviewed and are negative.      Historical Information   Past Medical History:   Diagnosis Date    ADHD     Adjustment disorder     Allergic     Anxiety     Asthma     Depression     Dysuria     Last assessed - 1/24/14    Hallucination     Psychosis ([facility])     Sleep difficulties     Substance abuse ([facility])     Suicide attempt ([facility])      No past surgical history on file.  Social History     Tobacco Use    Smoking status: Former     Current packs/day: 0.25     Average packs/day: 0.3 packs/day for 8.8 years (2.2 ttl pk-yrs)     Types: Cigarettes, Cigars     Start date: 6/27/2016    Smokeless tobacco: Never   Vaping Use    Vaping status: Never Used   Substance and Sexual Activity    Alcohol use: Yes     Alcohol/week: 2.0 standard drinks of alcohol     Types: 2 Cans of beer per week     Comment: socially    Drug use: Not Currently     Types: Marijuana, Amphetamines, Methamphetamines     Comment: UDS negative 4/18/2025    Sexual activity: Not Currently     Partners: Female     Birth control/protection: Condom Male     E-Cigarette/Vaping    E-Cigarette Use Never User      E-Cigarette/Vaping Substances    Nicotine Yes     THC No     CBD No      Flavoring No     Other No     Unknown No      Family history non-contributory      Meds/Allergies   I have reviewed home medications with patient personally.  Prior to Admission medications    Medication Sig Start Date End Date Taking? Authorizing Provider   lisinopril (ZESTRIL) 5 mg tablet Take 10 mg by mouth daily   Yes Historical Provider, MD   dicyclomine (BENTYL) 20 mg tablet Take 1 tablet (20 mg total) by mouth 2 (two) times a day  Patient not taking: Reported on 4/18/2025 3/21/25   Rupali Adame PA-C   doxepin (SINEquan) 10 mg capsule take 1 capsule by mouth at bedtime  Patient not taking: Reported on 4/18/2025 12/24/23   Francisco Michel MD   ondansetron (ZOFRAN) 4 mg tablet Take 1 tablet (4 mg total) by mouth every 6 (six) hours  Patient not taking: Reported on 4/18/2025 3/21/25   Rupali Adame PA-C   traZODone (DESYREL) 50 mg tablet Take 50 mg by mouth daily at bedtime  Patient not taking: Reported on 4/18/2025    Historical Provider, MD     No Known Allergies    Objective :  Temp:  [98 °F (36.7 °C)-98.1 °F (36.7 °C)] 98 °F (36.7 °C)  HR:  [59-89] 59  BP: (126-161)/() 126/76  Resp:  [16-18] 16  SpO2:  [98 %-100 %] 99 %  O2 Device: None (Room air)    Physical Exam  Vitals and nursing note reviewed.   Constitutional:       General: He is not in acute distress.     Appearance: He is well-developed. He is not ill-appearing.   HENT:      Head: Normocephalic and atraumatic.   Eyes:      General:         Right eye: No discharge.         Left eye: No discharge.      Extraocular Movements: Extraocular movements intact.      Conjunctiva/sclera: Conjunctivae normal.   Cardiovascular:      Rate and Rhythm: Normal rate and regular rhythm.      Heart sounds: No murmur heard.  Pulmonary:      Effort: Pulmonary effort is normal. No respiratory distress.      Breath sounds: Normal breath sounds. No wheezing, rhonchi or rales.   Abdominal:      General: Bowel sounds are normal. There is no distension.       "Palpations: Abdomen is soft.      Tenderness: There is no abdominal tenderness.   Musculoskeletal:      Cervical back: Neck supple.      Right lower leg: No edema.      Left lower leg: No edema.   Skin:     General: Skin is warm and dry.      Capillary Refill: Capillary refill takes less than 2 seconds.   Neurological:      General: No focal deficit present.      Mental Status: He is alert and oriented to person, place, and time. Mental status is at baseline.      Cranial Nerves: No cranial nerve deficit.   Psychiatric:         Mood and Affect: Mood normal.         Behavior: Behavior normal.          Lines/Drains:            Lab Results: I have reviewed the following results:  Results from last 7 days   Lab Units 04/19/25  0602   WBC Thousand/uL 5.86   HEMOGLOBIN g/dL 15.0   HEMATOCRIT % 45.3   PLATELETS Thousands/uL 231   SEGS PCT % 45   LYMPHO PCT % 41   MONO PCT % 6   EOS PCT % 7*     Results from last 7 days   Lab Units 04/19/25  0602   SODIUM mmol/L 139   POTASSIUM mmol/L 4.0   CHLORIDE mmol/L 104   CO2 mmol/L 29   BUN mg/dL 9   CREATININE mg/dL 1.06   ANION GAP mmol/L 6   CALCIUM mg/dL 9.5   ALBUMIN g/dL 4.2   TOTAL BILIRUBIN mg/dL 0.61   ALK PHOS U/L 42   ALT U/L 15   AST U/L 16   GLUCOSE RANDOM mg/dL 92             No results found for: \"HGBA1C\"        Imaging Results Review: No pertinent imaging studies reviewed.  Other Study Results Review: EKG was reviewed.     Administrative Statements   I have spent a total time of 35 minutes in caring for this patient on the day of the visit/encounter including Diagnostic results, Documenting in the medical record, Reviewing/placing orders in the medical record (including tests, medications, and/or procedures), Obtaining or reviewing history  , and Communicating with other healthcare professionals .    ** Please Note: This note has been constructed using a voice recognition system. **    "

## 2025-04-19 NOTE — NURSING NOTE
- 1 pair shoes black nike with laces  - 1 pair socks black  - 1 pair sweat pants, white w/ black no strings  - 1 Tshirt (white)  - 1 sweatshirt, black (valeri dominguez)  - 1 coat w/ guerrero and black  - 1 cellphone  - 3 keys on 3 keys rings  -1 wallet, black [place], [place] card, EBT card, paypal debit card, 2 medical cards

## 2025-04-19 NOTE — NURSING NOTE
"Pt stated to peer nurse that his hallucinations were \"really bad\" and causing him to feel anxious. Pt received atarax 50 mg for a amin of 19 and Zyprexa 10 mg for a Broset of 3 @2014. Upon follow up pt reports that medication was very helpful at reducing the voices and he felt much calmer. Pt inquired about names of medication, interested in starting them as scheduled medication due to the positive effect on his symptoms. Provided with names of medications and encouraged to talk to provider tomorrow. Medication effective.   "

## 2025-04-19 NOTE — NURSING NOTE
At 16:17 RN administered Zyprexa 5 mg PO for paranoia and moderate agitation (Broset=1). Patient reports feeling like someone is touching him. Upon follow-up, patient endorses medication effectiveness.

## 2025-04-19 NOTE — TREATMENT PLAN
TREATMENT PLAN REVIEW - Behavioral Health Leann Mcnair 30 y.o. 1995 male MRN: [id number]    [facility] QU IP BEHAVIORAL HLTH Room / Bed: [facility] 205/[facility] 205-01 Encounter: [id number]          Admit Date/Time:  4/18/2025  7:10 PM    Treatment Team:   MD Tiara Miller, MARY Messer, MARY Serrano, MARY Lucas, SHARIFA Dave    Diagnosis: Principal Problem:    Psychotic disorder (HCC)      Patient Strengths/Assets: ability for insight    Patient Barriers/Limitations: difficulty adapting    Short Term Goals: decrease in psychotic symptoms    Long Term Goals: resolution of psychotic symptoms    Progress Towards Goals: starting psychiatric medications as prescribed    Recommended Treatment: medication management, patient medication education, group therapy, milieu therapy, continued Behavioral Health psychiatric evaluation/assessment process    Treatment Frequency: daily medication monitoring, group and milieu therapy daily, monitoring through interdisciplinary rounds, monitoring through weekly patient care conferences    Expected Discharge Date:  4/29/25    Discharge Plan: discharge to home    Treatment Plan Created/Updated By: Laureano Aburto MD

## 2025-04-20 PROCEDURE — 99232 SBSQ HOSP IP/OBS MODERATE 35: CPT | Performed by: STUDENT IN AN ORGANIZED HEALTH CARE EDUCATION/TRAINING PROGRAM

## 2025-04-20 RX ORDER — OLANZAPINE 5 MG/1
5 TABLET, FILM COATED ORAL DAILY
Status: DISCONTINUED | OUTPATIENT
Start: 2025-04-21 | End: 2025-04-25 | Stop reason: HOSPADM

## 2025-04-20 RX ORDER — OLANZAPINE 10 MG/1
10 TABLET, FILM COATED ORAL
Status: DISCONTINUED | OUTPATIENT
Start: 2025-04-20 | End: 2025-04-23

## 2025-04-20 RX ADMIN — LISINOPRIL 10 MG: 10 TABLET ORAL at 08:13

## 2025-04-20 RX ADMIN — NICOTINE 1 PATCH: 21 PATCH, EXTENDED RELEASE TRANSDERMAL at 08:15

## 2025-04-20 RX ADMIN — TRAZODONE HYDROCHLORIDE 50 MG: 50 TABLET ORAL at 21:25

## 2025-04-20 RX ADMIN — OLANZAPINE 10 MG: 10 TABLET, FILM COATED ORAL at 21:25

## 2025-04-20 RX ADMIN — OLANZAPINE 5 MG: 5 TABLET, FILM COATED ORAL at 08:13

## 2025-04-20 NOTE — ASSESSMENT & PLAN NOTE
30-year-old man who presented with auditory hallucinations, titrating Zyprexa to efficacy.  Patient has been utilizing Zyprexa as needed doses on top of his standing regimen and received 20 mg of Zyprexa yesterday.  Discussed increasing his standing regimen to 15 mg daily in the form of 5 mg in the morning and 10 mg at bedtime and leaving as needed dose of Zyprexa but counseled him to only use this if auditory hallucinations are worsening.    -- Increase Zyprexa to 5 mg daily and 10 mg at bedtime

## 2025-04-20 NOTE — NURSING NOTE
Patient withdrawn to self but observed in hallway at times. Minimal conversation. Patient showered and attended to ADLs. Denies SI/HI but endorsing negative AH. Reports agitation related to AH, and patient agreeable to PRN Zyprexa.

## 2025-04-20 NOTE — TREATMENT TEAM
04/20/25 0800   Team Meeting   Meeting Type Daily Rounds   Team Members Present   Team Members Present Physician;Nurse   Physician Team Member Criselda / Arnold   Nursing Team Member Clauser   Patient/Family Present   Patient Present No   Patient's Family Present No       Morning rounds - Paranoia and agitation, feels like someone is touching him. Showered, visible at times, reports negative AH that frustrate him. Scant, keeps to self. Received PRN Zyprexa 5 mg and 10 mg.

## 2025-04-20 NOTE — NURSING NOTE
Kwamane was given PRN Zyprexa 10 mg for c/o increased negative AH, Broset = 1. Upon follow-up for reassessment, patient endorses moderate relief, is observed watching TV in the dayroom. Staff availability reinforced.

## 2025-04-20 NOTE — PROGRESS NOTES
Progress Note - Behavioral Health   Name: Leann Mcnair 30 y.o. male I MRN: [id number]  Unit/Bed#: -01 I Date of Admission: 4/18/2025   Date of Service: 4/20/2025 I Hospital Day: 2        Assessment & Plan  Psychotic disorder (HCC)  30-year-old man who presented with auditory hallucinations, titrating Zyprexa to efficacy.  Patient has been utilizing Zyprexa as needed doses on top of his standing regimen and received 20 mg of Zyprexa yesterday.  Discussed increasing his standing regimen to 15 mg daily in the form of 5 mg in the morning and 10 mg at bedtime and leaving as needed dose of Zyprexa but counseled him to only use this if auditory hallucinations are worsening.    -- Increase Zyprexa to 5 mg daily and 10 mg at bedtime     Current medications:  Current Facility-Administered Medications   Medication Dose Route Frequency Provider Last Rate    acetaminophen  650 mg Oral Q6H PRN Jake Carr, MARVA      acetaminophen  650 mg Oral Q4H PRN Jake Carr, PA-LOGAN      acetaminophen  975 mg Oral Q6H PRN Jake Carr, PA-C      aluminum-magnesium hydroxide-simethicone  30 mL Oral Q4H PRN Jake Carr, PA-LOGAN      artificial tear   Both Eyes 4x Daily PRN Jake Carr, PA-LOGAN      benztropine  1 mg Intramuscular BID PRN Jake Carr, PA-LOGAN      benztropine  1 mg Oral BID PRN Jake Carr, PA-LOGAN      bisacodyl  10 mg Rectal Daily PRN Jake Carr, PA-LOGAN      hydrOXYzine HCL  50 mg Oral Q6H PRN Max 4/day RICCO Vasquez-LOGAN      Or    diphenhydrAMINE  50 mg Intramuscular Q6H PRN Jake Carr, PA-LOGAN      hydrOXYzine HCL  100 mg Oral Q6H PRN Max 4/day Jake Carr PA-C      Or    LORazepam  2 mg Intramuscular Q6H PRN Jake Carr, MARVA      hydrOXYzine HCL  25 mg Oral Q6H PRN Max 4/day Jake Carr, PA-LOGAN      lisinopril  10 mg Oral Daily Alondra Hawkins PA-C      magnesium hydroxide  30 mL Oral Daily PRN Jake Carr, MARVA      nicotine  1 patch Transdermal Daily Jake GONZALES  MARVA Carr      nicotine polacrilex  4 mg Oral Q2H PRN Jake Carr PA-C      OLANZapine  10 mg Oral Q3H PRN Max 3/day Jake Carr PA-C      Or    OLANZapine  10 mg Intramuscular Q3H PRN Max 3/day Jake Carr PA-C      OLANZapine  5 mg Oral Q3H PRN Max 6/day Jake Carr PA-C      Or    OLANZapine  5 mg Intramuscular Q3H PRN Max 6/day Jake Carr PA-C      OLANZapine  10 mg Oral HS Laureano Aburto MD      OLANZapine  2.5 mg Oral Q3H PRN Max 8/day Jake Carr PA-C      [START ON 4/21/2025] OLANZapine  5 mg Oral Daily Laureano Aburto MD      propranolol  10 mg Oral Q8H PRN Jake Carr PA-C      senna-docusate sodium  1 tablet Oral Daily PRN Jake Carr PA-C      traZODone  50 mg Oral HS PRN Jake Carr PA-C         Risks / Benefits of Treatment:    Risks, benefits, and possible side effects of medications explained to patient and patient verbalizes understanding and agreement for treatment.    Progress Toward Goals: progressing    Treatment Planning:  All current active medications have been reviewed.  Continue to monitor response to treatment and assess for potential side effects of medications.  Encourage group therapy, milieu therapy and occupational therapy  Collaboration with medical service for medical comorbidities as indicated.  Behavioral Health checks for safety monitoring.  Estimated Discharge Day:     Subjective:    Behavior over the last 24 hours: unchanged    No acute behavioral events over the past 24 hours. Today, patient was seen and examined at bedside for continuation of care.     Patient was calm and cooperative with interview.  Discussed his medication regimen with him and he says that he has been taking his standing Zyprexa along with as needed doses of Zyprexa due to ongoing auditory hallucinations.  He received 20 mg of Zyprexa yesterday and now reports improvement in his auditory hallucinations.  We discussed increasing his standing regimen  to 5 mg in the morning and 10 mg Zyprexa at night to which he is amenable.  Counseled him to try to use as little as needed doses as possible so we can find the lowest effective dose of Zyprexa.  Denies current SI/HI/AVH.    Patient denied other new or worsening psychiatric symptoms/complaints at this time.    Sleep: unchanged  Appetite: unchanged  Medication side effects: none reported  ROS: review of systems as noted in HPI, all other systems are negative    Objective:    Vital signs in last 24 hours:    Temp:  [97.7 °F (36.5 °C)-99.2 °F (37.3 °C)] 98 °F (36.7 °C)  HR:  [79-90] 84  BP: (125-135)/(82-93) 125/93  Resp:  [16-18] 16  SpO2:  [96 %-98 %] 98 %  O2 Device: None (Room air)    Mental Status Evaluation:    Appearance: disheveled, appears consistent with stated age  Motor: no psychomotor disturbances, no gait abnormalities  Behavior: superficially cooperative, disorganized  Speech: normal rate and rhythm  Mood: better  Affect: blunted  Thought Process: goal directed  Thought Content: denies delusions  Risk Potential: denies suicidal ideation, plan, or intent. Denies homicidal ideation  Perceptions: +IP/-RIS, denies auditory hallucinations, denies visual hallucinations  Sensorium: Oriented to person, place, time, and situation  Cognition: cognitive ability appears intact but was not quantitatively tested  Consciousness: alert and awake  Attention: intact  Insight: limited  Judgement: limited        Lab Results: I have reviewed the following results:  Recent Results (from the past 48 hours)   Comprehensive metabolic panel    Collection Time: 04/19/25  6:02 AM   Result Value Ref Range    Sodium 139 135 - 147 mmol/L    Potassium 4.0 3.5 - 5.3 mmol/L    Chloride 104 96 - 108 mmol/L    CO2 29 21 - 32 mmol/L    ANION GAP 6 4 - 13 mmol/L    BUN 9 5 - 25 mg/dL    Creatinine 1.06 0.60 - 1.30 mg/dL    Glucose 92 65 - 140 mg/dL    Glucose, Fasting 92 65 - 99 mg/dL    Calcium 9.5 8.4 - 10.2 mg/dL    AST 16 13 - 39 U/L     ALT 15 7 - 52 U/L    Alkaline Phosphatase 42 34 - 104 U/L    Total Protein 7.2 6.4 - 8.4 g/dL    Albumin 4.2 3.5 - 5.0 g/dL    Total Bilirubin 0.61 0.20 - 1.00 mg/dL    eGFR 93 ml/min/1.73sq m   CBC and differential    Collection Time: 04/19/25  6:02 AM   Result Value Ref Range    WBC 5.86 4.31 - 10.16 Thousand/uL    RBC 5.27 3.88 - 5.62 Million/uL    Hemoglobin 15.0 12.0 - 17.0 g/dL    Hematocrit 45.3 36.5 - 49.3 %    MCV 86 82 - 98 fL    MCH 28.5 26.8 - 34.3 pg    MCHC 33.1 31.4 - 37.4 g/dL    RDW 13.0 11.6 - 15.1 %    MPV 12.6 8.9 - 12.7 fL    Platelets 231 149 - 390 Thousands/uL    nRBC 0 /100 WBCs    Segmented % 45 43 - 75 %    Immature Grans % 0 0 - 2 %    Lymphocytes % 41 14 - 44 %    Monocytes % 6 4 - 12 %    Eosinophils Relative 7 (H) 0 - 6 %    Basophils Relative 1 0 - 1 %    Absolute Neutrophils 2.63 1.85 - 7.62 Thousands/µL    Absolute Immature Grans 0.01 0.00 - 0.20 Thousand/uL    Absolute Lymphocytes 2.42 0.60 - 4.47 Thousands/µL    Absolute Monocytes 0.34 0.17 - 1.22 Thousand/µL    Eosinophils Absolute 0.39 0.00 - 0.61 Thousand/µL    Basophils Absolute 0.07 0.00 - 0.10 Thousands/µL   Lipid panel    Collection Time: 04/19/25  6:02 AM   Result Value Ref Range    Cholesterol 143 See Comment mg/dL    Triglycerides 151 (H) See Comment mg/dL    HDL, Direct 36 (L) >=40 mg/dL    LDL Calculated 77 0 - 100 mg/dL    Non-HDL-Chol (CHOL-HDL) 107 mg/dl   TSH, 3rd generation with Free T4 reflex    Collection Time: 04/19/25  6:02 AM   Result Value Ref Range    TSH 3RD GENERATON 0.607 0.450 - 4.500 uIU/mL   Hemoglobin A1C    Collection Time: 04/19/25  6:02 AM   Result Value Ref Range    Hemoglobin A1C 5.5 Normal 4.0-5.6%; PreDiabetic 5.7-6.4%; Diabetic >=6.5%; Glycemic control for adults with diabetes <7.0% %     mg/dl   RPR-Syphilis Screening (Total Syphilis IGG/IGM)    Collection Time: 04/19/25  6:02 AM   Result Value Ref Range    Treponema Pallidium Total Antibodies Non-reactive Non-reactive   Vitamin D 25  hydroxy    Collection Time: 04/19/25  6:02 AM   Result Value Ref Range    Vit D, 25-Hydroxy <7.0 (L) 30.0 - 100.0 ng/mL   Vitamin B12    Collection Time: 04/19/25  6:02 AM   Result Value Ref Range    Vitamin B-12 442 180 - 914 pg/mL   Folate    Collection Time: 04/19/25  6:02 AM   Result Value Ref Range    Folate 12.1 >5.9 ng/mL        Administrative Statements    Counseling / Coordination of Care:   Patients progress discussed with staff in treatment team meeting. Medication changes reviewed with staff in treatment team meeting.    Laureano Aburto MD 04/20/25    This note has been constructed using a voice recognition system. There may be translation, syntax, or grammatical errors. If you have any questions, please contact the dictating provider.

## 2025-04-20 NOTE — NURSING NOTE
Upon approach, patient in dayroom watching television with peers. Reports neutral mood and slept well last night. Denies SI/HI. Endorses ongoing AH of whispers and VH of lights and people; however, improved since admission. Informed RN his AH onset was about four years ago. Medication compliant. Educated patient on potential for metabolic syndrome while taking Zyprexa  Consumed breakfast and lunch entirety. Physical assessment WNL. Plan of care ongoing. Denies unmet needs.

## 2025-04-20 NOTE — ED NOTES
Insurance Authorization for admission    Spoke to MaryLee at [facility].    Pt approved 4 days, 4/18-4/21, review on 4/22.    Auth # [id number]

## 2025-04-20 NOTE — PLAN OF CARE
Problem: Alteration in Thoughts and Perception  Goal: Treatment Goal: Gain control of psychotic behaviors/thinking, reduce/eliminate presenting symptoms and demonstrate improved reality functioning upon discharge  Outcome: Progressing     Problem: Depression  Goal: Treatment Goal: Demonstrate behavioral control of depressive symptoms, verbalize feelings of improved mood/affect, and adopt new coping skills prior to discharge  Outcome: Progressing     Problem: Anxiety  Goal: Anxiety is at manageable level  Description: Interventions:- Assess and monitor patient's anxiety level. - Monitor for signs and symptoms (heart palpitations, chest pain, shortness of breath, headaches, nausea, feeling jumpy, restlessness, irritable, apprehensive). - Collaborate with interdisciplinary team and initiate plan and interventions as ordered.- [place] patient to unit/surroundings- Explain treatment plan- Encourage participation in care- Encourage verbalization of concerns/fears- Identify coping mechanisms- Assist in developing anxiety-reducing skills- Administer/offer alternative therapies- Limit or eliminate stimulants  Outcome: Progressing     Problem: DISCHARGE PLANNING  Goal: Discharge to home or other facility with appropriate resources  Description: INTERVENTIONS:- Identify barriers to discharge w/patient and caregiver- Arrange for needed discharge resources and transportation as appropriate- Identify discharge learning needs (meds, wound care, etc.)- Arrange for interpretive services to assist at discharge as needed- Refer to Case Management Department for coordinating discharge planning if the patient needs post-hospital services based on physician/advanced practitioner order or complex needs related to functional status, cognitive ability, or social support system  Outcome: Progressing

## 2025-04-21 PROCEDURE — 99232 SBSQ HOSP IP/OBS MODERATE 35: CPT | Performed by: STUDENT IN AN ORGANIZED HEALTH CARE EDUCATION/TRAINING PROGRAM

## 2025-04-21 RX ADMIN — LISINOPRIL 10 MG: 10 TABLET ORAL at 08:29

## 2025-04-21 RX ADMIN — OLANZAPINE 10 MG: 10 TABLET, FILM COATED ORAL at 21:01

## 2025-04-21 RX ADMIN — TRAZODONE HYDROCHLORIDE 50 MG: 50 TABLET ORAL at 21:28

## 2025-04-21 RX ADMIN — NICOTINE 1 PATCH: 21 PATCH, EXTENDED RELEASE TRANSDERMAL at 08:31

## 2025-04-21 RX ADMIN — OLANZAPINE 5 MG: 5 TABLET, FILM COATED ORAL at 08:29

## 2025-04-21 NOTE — PROGRESS NOTES
Progress Note - Behavioral Health     Name: Leann Mcnair 30 y.o. male I MRN: [id number]   Unit/Bed#: -01 I Date of Admission: 4/18/2025   Date of Service: 4/21/2025 I Hospital Day: 3         Assessment & Plan  Psychotic disorder (HCC)  30-year-old man who presented with auditory hallucinations, titrating Zyprexa to efficacy.  Patient has been utilizing Zyprexa as needed doses on top of his standing regimen and received 20 mg of Zyprexa yesterday.  Discussed increasing his standing regimen to 15 mg daily in the form of 5 mg in the morning and 10 mg at bedtime and leaving as needed dose of Zyprexa but counseled him to only use this if auditory hallucinations are worsening.    -- Continue Zyprexa to 5 mg daily and 10 mg at bedtime     Principal Problem:    Psychotic disorder (HCC)  Active Problems:    Medical clearance for psychiatric admission    HTN (hypertension)       Recommended Treatment:     Planned medication and treatment changes:    All current active medications have been reviewed  Encourage group therapy, milieu therapy and occupational therapy  Behavioral Health checks every 15 minutes  On a 201 commitment status  Continue current medications:    Current medications:  Current Facility-Administered Medications   Medication Dose Route Frequency Provider Last Rate    acetaminophen  650 mg Oral Q6H PRN Jake Carr PA-C      acetaminophen  650 mg Oral Q4H PRN Jake Carr PA-C      acetaminophen  975 mg Oral Q6H PRN Jake Carr PA-C      aluminum-magnesium hydroxide-simethicone  30 mL Oral Q4H PRN Jake Carr PA-C      artificial tear   Both Eyes 4x Daily PRN Jake Carr PA-C      benztropine  1 mg Intramuscular BID PRN Jake Carr PA-C      benztropine  1 mg Oral BID PRN Jake Carr PA-C      bisacodyl  10 mg Rectal Daily PRN Jake Carr PA-C      hydrOXYzine HCL  50 mg Oral Q6H PRN Max 4/day Jake Carr PA-C      Or    diphenhydrAMINE  50 mg Intramuscular  "Q6H PRN Jake GONZALES Lilly, MARVA      hydrOXYzine HCL  100 mg Oral Q6H PRN Max 4/day Jake Carr PA-C      Or    LORazepam  2 mg Intramuscular Q6H PRN Jake GONZALES Carr, MARVA      hydrOXYzine HCL  25 mg Oral Q6H PRN Max 4/day Jake GONZALES Lilly, MARVA      lisinopril  10 mg Oral Daily Alondra Hawkins, MARVA      magnesium hydroxide  30 mL Oral Daily PRN Jake Craigmore, MARVA      nicotine  1 patch Transdermal Daily Jake GONZALES Carr, MARVA      nicotine polacrilex  4 mg Oral Q2H PRN Jake GONZALES Carr, MARVA      OLANZapine  5 mg Oral Q3H PRN Max 6/day Jake GONZALES Lilly, MARVA      Or    OLANZapine  5 mg Intramuscular Q3H PRN Max 6/day Jake S Lilly, MARVA      OLANZapine  10 mg Oral HS Laureano Aburto MD      OLANZapine  2.5 mg Oral Q3H PRN Max 8/day Jake S Lilly, MARVA      OLANZapine  5 mg Oral Daily Laureano Aburto MD      propranolol  10 mg Oral Q8H PRN Jake GONZALES Carr, MARVA      senna-docusate sodium  1 tablet Oral Daily PRN Jake Craigmore, MARVA      traZODone  50 mg Oral HS PRN Jake GONZALES Lilly, MARVA         Behavior over the last 24 hours: unchanged.     Patient was seen, chart was reviewed/discussed with the treatment team and interviewed on the unit. Per nursing, patient is visible on the unit, social with peers, attending groups and compliant with medications/meals. Upon approach, patient was calm, cooperative and sitting by the phone. He reports feeling \"a lot better\" since increase in Zyprexa currently doesn't feel the need for a PRN dose of Zyprexa. Patient feels more stable and \"felt like medicine worked.\" He denies any side effects. He currently denies AH. Some VH of bright white light in her peripherals. Slept well, appetite is adequate. Patient denies SI/HI     Sleep: slept better  Appetite: normal  Medication side effects: No   ROS: no complaints    Mental Status Evaluation:    Appearance:  dressed appropriately, adequate grooming, looks stated age   Behavior:  pleasant, cooperative, calm " "  Speech:  normal rate and volume   Mood:  improved   Affect:  normal range and intensity   Thought Process:  organized, logical, goal directed   Associations: intact associations   Thought Content:  no overt delusions   Perceptual Disturbances: no auditory hallucinations, visual hallucinations of \"bright white light on the side of my eyes\"   Risk Potential: Suicidal ideation - None  Homicidal ideation - None  Potential for aggression - No   Sensorium:  oriented to person, place, and time/date   Memory:  recent and remote memory grossly intact   Consciousness:  alert and awake   Attention/Concentration: attention span and concentration are age appropriate   Insight:  age appropriate   Judgment: age appropriate   Gait/Station: normal gait/station   Motor Activity: no abnormal movements     Vital signs in last 24 hours:    Temp:  [97.8 °F (36.6 °C)-97.9 °F (36.6 °C)] 97.9 °F (36.6 °C)  HR:  [86] 86  BP: (116-148)/() 116/72  Resp:  [16] 16  SpO2:  [99 %-100 %] 100 %  O2 Device: None (Room air)    Laboratory results: I have personally reviewed all pertinent laboratory/tests results    Results from the past 24 hours: No results found for this or any previous visit (from the past 24 hours).    Progress Toward Goals: progressing    Risks / Benefits of Treatment:    Risks, benefits, and possible side effects of medications explained to patient and patient verbalizes understanding and agreement for treatment.    Counseling / Coordination of Care:    Patient's progress discussed with staff in treatment team meeting.  Medications, treatment progress and treatment plan reviewed with patient.    TANI Acosta 04/21/25    This note was constructed with the assistance of network approved dictation software. Please excuse any minor errors of syntax or grammar as a result.      "

## 2025-04-21 NOTE — NURSING NOTE
Pt in room on approach; Calm, cooperative and pleasant during interaction. Pt reports sleeping well overnight; Denies SI/HI/VH. Pt reports intermittent AH but none at this time. Pt reports feeling good on current treatment regimen. Pt denies any unmet needs or concerns at this time; Will continue to monitor.

## 2025-04-21 NOTE — NURSING NOTE
Leann is calm upon approach, observed watching TV with peers and later reading the patient privacy practice handout in his folder. Patient denied current SI/HI/VH, endorses intermittent AH of whispering voices. Leann denied need for PRN intervention at this time, reports feeling improvement over the past day. Patient remains compliant with prescribed medications and attends select groups with encouragement. Leann denies any questions and/or concerns at this time. Staff availability reinforced.

## 2025-04-21 NOTE — NURSING NOTE
Patient requested and received Trazodone 50 mg PO at 2125 for sleep. Upon follow up, patient appears to be asleep in bed. Respirations are even and unlabored, and he is in no apparent distress. Medication effective for sleep.

## 2025-04-21 NOTE — PLAN OF CARE
Pt has been provided with information about opportunities for therapeutic programming    Problem: Ineffective Coping  Goal: Participates in unit activities  Description: Interventions:- Provide therapeutic environment - Provide required programming - Redirect inappropriate behaviors   Outcome: Progressing

## 2025-04-21 NOTE — PROGRESS NOTES
04/21/25 1400   Activity/Group Checklist   Group Impromptu group (Comment)  (Discussion with HOWARD/L about the importance of medication adherence, treatment, therapy, and coping with the cycle of reaction.)   Attendance Attended   Attendance Duration (min) 16-30   Interactions Interacted appropriately   Affect/Mood Appropriate;Calm   Goals Achieved Identified feelings;Identified triggers;Discussed coping strategies;Able to reflect/comment on own behavior;Able to self-disclose;Able to recieve feedback;Able to give feedback to another

## 2025-04-21 NOTE — ASSESSMENT & PLAN NOTE
30-year-old man who presented with auditory hallucinations, titrating Zyprexa to efficacy.  Patient has been utilizing Zyprexa as needed doses on top of his standing regimen and received 20 mg of Zyprexa yesterday.  Discussed increasing his standing regimen to 15 mg daily in the form of 5 mg in the morning and 10 mg at bedtime and leaving as needed dose of Zyprexa but counseled him to only use this if auditory hallucinations are worsening.    -- Continue Zyprexa to 5 mg daily and 10 mg at bedtime

## 2025-04-21 NOTE — PROGRESS NOTES
"   04/21/25 4088   Team Meeting   Meeting Type Daily Rounds   Team Members Present   Team Members Present Physician;Nurse;;Other (Discipline and Name)   Physician Team Member Dr. Castellano / Dr. Davis   Nursing Team Member Hernandez   Care Management Team Member Lacy / Jemima / Sera / Josue   Other (Discipline and Name) Sandra (Group Facilitator)   Patient/Family Present   Patient Present No   Patient's Family Present No     Treatment Team Rounds Completed  Medical and Psychiatric Review Completed  Status: Per admission note:     \"Pt is a 201 from [place] [facility]. Pt presented to ED with psychosis, paranoia, and hallucinations. Upon admission Pt reports anxiety and moderate depression as well. Pt denies SI/HI. Reports AH that are negative. Reports VH of \"thermal lights and people.\" Pt reports having hallucinations for 5 years however, recently they are getting worse. Reports struggling with ADL's d/t voices. Reports struggling to hold a job d/t voices. Pt reports 2 prior inpatients. Pt was last admitted tin [facility] at [facility].. Hx: HTN (takes lisinopril 5 mg). Pt reports poor sleep, good appetite. UDS - Cooperative and pleasant during admission process. Pt lives with mother and step dad where he can return after discharge.\"    Readmit score: 26(red), AUDIT: 0, PAWSS: 0, BAT: 0, UDS: Negative    D/C: Tentative discharge for 4/28  "

## 2025-04-22 LAB
ATRIAL RATE: 74 BPM
P AXIS: 58 DEGREES
PR INTERVAL: 164 MS
QRS AXIS: 83 DEGREES
QRSD INTERVAL: 82 MS
QT INTERVAL: 402 MS
QTC INTERVAL: 446 MS
T WAVE AXIS: 58 DEGREES
VENTRICULAR RATE: 74 BPM

## 2025-04-22 PROCEDURE — 93010 ELECTROCARDIOGRAM REPORT: CPT | Performed by: INTERNAL MEDICINE

## 2025-04-22 PROCEDURE — 99232 SBSQ HOSP IP/OBS MODERATE 35: CPT | Performed by: STUDENT IN AN ORGANIZED HEALTH CARE EDUCATION/TRAINING PROGRAM

## 2025-04-22 RX ADMIN — LISINOPRIL 10 MG: 10 TABLET ORAL at 08:53

## 2025-04-22 RX ADMIN — TRAZODONE HYDROCHLORIDE 50 MG: 50 TABLET ORAL at 21:22

## 2025-04-22 RX ADMIN — OLANZAPINE 5 MG: 5 TABLET, FILM COATED ORAL at 08:53

## 2025-04-22 RX ADMIN — HYDROXYZINE HYDROCHLORIDE 50 MG: 50 TABLET, FILM COATED ORAL at 09:57

## 2025-04-22 RX ADMIN — OLANZAPINE 10 MG: 10 TABLET, FILM COATED ORAL at 21:22

## 2025-04-22 RX ADMIN — NICOTINE 1 PATCH: 21 PATCH, EXTENDED RELEASE TRANSDERMAL at 08:54

## 2025-04-22 NOTE — PROGRESS NOTES
Progress Note - Behavioral Health   Name: Leann Mcnair 30 y.o. male I MRN: [id number]  Unit/Bed#: U 205-01 I Date of Admission: 4/18/2025   Date of Service: 4/22/2025 I Hospital Day: 4     Assessment & Plan  Psychotic disorder (HCC)  30-year-old man who presented with auditory hallucinations, titrating Zyprexa to efficacy.  Patient has been utilizing Zyprexa as needed doses on top of his standing regimen and received 20 mg of Zyprexa yesterday.  Discussed increasing his standing regimen to 15 mg daily in the form of 5 mg in the morning and 10 mg at bedtime and leaving as needed dose of Zyprexa but counseled him to only use this if auditory hallucinations are worsening.    -- Continue Zyprexa to 5 mg daily and 10 mg at bedtime, consider further titration for ongoing AH    Current Medications:    Current Facility-Administered Medications:     lisinopril (ZESTRIL) tablet 10 mg, Oral, Daily    nicotine (NICODERM CQ) 21 mg/24 hr TD 24 hr patch 1 patch, Transdermal, Daily    OLANZapine (ZyPREXA) tablet 10 mg, Oral, HS    OLANZapine (ZyPREXA) tablet 5 mg, Oral, Daily    Current Facility-Administered Medications:     acetaminophen (TYLENOL) tablet 650 mg, Oral, Q6H PRN    acetaminophen (TYLENOL) tablet 650 mg, Oral, Q4H PRN    acetaminophen (TYLENOL) tablet 975 mg, Oral, Q6H PRN    aluminum-magnesium hydroxide-simethicone (MAALOX) oral suspension 30 mL, Oral, Q4H PRN    artificial tear ophthalmic ointment, Both Eyes, 4x Daily PRN    benztropine (COGENTIN) injection 1 mg, Intramuscular, BID PRN    benztropine (COGENTIN) tablet 1 mg, Oral, BID PRN    bisacodyl (DULCOLAX) rectal suppository 10 mg, Rectal, Daily PRN    hydrOXYzine HCL (ATARAX) tablet 50 mg, Oral, Q6H PRN Max 4/day **OR** diphenhydrAMINE (BENADRYL) injection 50 mg, Intramuscular, Q6H PRN    hydrOXYzine HCL (ATARAX) tablet 100 mg, Oral, Q6H PRN Max 4/day **OR** LORazepam (ATIVAN) injection 2 mg, Intramuscular, Q6H PRN    hydrOXYzine HCL (ATARAX) tablet 25 mg,  "Oral, Q6H PRN Max 4/day    magnesium hydroxide (MILK OF MAGNESIA) oral suspension 30 mL, Oral, Daily PRN    nicotine polacrilex (NICORETTE) gum 4 mg, Oral, Q2H PRN    OLANZapine (ZyPREXA) tablet 5 mg, Oral, Q3H PRN Max 6/day **OR** OLANZapine (ZyPREXA) IM injection 5 mg, Intramuscular, Q3H PRN Max 6/day    OLANZapine (ZyPREXA) tablet 2.5 mg, Oral, Q3H PRN Max 8/day    propranolol (INDERAL) tablet 10 mg, Oral, Q8H PRN    senna-docusate sodium (SENOKOT S) 8.6-50 mg per tablet 1 tablet, Oral, Daily PRN    traZODone (DESYREL) tablet 50 mg, Oral, HS PRN    Risks/Benefits of Treatment:     Risks, benefits, and possible side effects of medications explained to patient and patient verbalizes understanding and agreement for treatment.    Progress Toward Goals: progressing    Treatment Planning:     All current active medications have been reviewed.  Continue to monitor response to treatment and assess for potential side effects of medications.  Encourage group therapy, milieu therapy and occupational therapy.  Collaboration with medical service for medical comorbidities as indicated.  Behavioral Health checks for safety monitoring.  Estimated Discharge Day:  3 days (4/25/2025)  Legal Status : Voluntary 201 commitment.  Long Stay Certification : Not Applicable    Subjective     Behavior over the last 24 hours: slowly improving    Per staff, Leann is calm and cooperative on the unit.  He continues to report auditory hallucinations but notes they are improving.  He has been reporting intermittent anxiety.  He received as needed trazodone last night for sleep which was effective.    Leann was seen privately in the group room today for psychiatric follow-up.  He is calm and cooperative throughout the interview today.  He states he feels he is more \"calm\".  However he also reports feeling restless at times still.  He continues to report auditory hallucinations of voices that are not command in nature.  He reports that his " "sleep has improved since admission.  He reports ongoing anxiety at times and notes that Atarax is effective for him.  He is denying any suicidal or homicidal ideation intent or plan today.  He is agreeable to continue titration of Zyprexa if indicated.  We will consider further titration tomorrow.  He denies any visual hallucinations.    Sleep: normal, improved  Appetite: normal  Medication side effects: No  ROS: review of systems as noted above in HPI/Subjective report, all other systems are negative    Objective :  Temp:  [98 °F (36.7 °C)] 98 °F (36.7 °C)  HR:  [84] 84  BP: (116)/(77) 116/77  Resp:  [18] 18  SpO2:  [99 %] 99 %  O2 Device: None (Room air)    Mental Status Evaluation:    Appearance:  casually dressed, marginal hygiene   Behavior:  cooperative, calm   Speech:  normal rate and volume   Mood:  anxious   Affect:  constricted   Thought Process:  concrete, more goal directed   Thought Content:  no overt delusions, negative thoughts   Perceptual Disturbances: auditory hallucinations of \"voices\" still present, but less frequent, denies VH   Risk Potential: Suicidal Ideation -  None  Homicidal Ideation -  None  Potential for Aggression - No   Sensorium:  oriented to person, place, and time/date   Memory:  recent memory intact   Consciousness:  alert and awake   Attention/Concentration: attention span and concentration appear shorter than expected for age   Insight:  partial   Judgment: partial   Gait/Station: normal gait/station   Motor Activity: no abnormal movements       Lab Results: I have reviewed the following results:  Results from the past 24 hours: No results found for this or any previous visit (from the past 24 hours).  Most Recent Labs:   Lab Results   Component Value Date    WBC 5.86 04/19/2025    RBC 5.27 04/19/2025    HGB 15.0 04/19/2025    HCT 45.3 04/19/2025     04/19/2025    RDW 13.0 04/19/2025    NEUTROABS 2.63 04/19/2025    TOTANEUTABS 13.64 (H) 03/21/2025    SODIUM 139 04/19/2025 " "   K 4.0 04/19/2025     04/19/2025    CO2 29 04/19/2025    BUN 9 04/19/2025    CREATININE 1.06 04/19/2025    GLUC 92 04/19/2025    CALCIUM 9.5 04/19/2025    AST 16 04/19/2025    ALT 15 04/19/2025    ALKPHOS 42 04/19/2025    TP 7.2 04/19/2025    ALB 4.2 04/19/2025    TBILI 0.61 04/19/2025    CHOLESTEROL 143 04/19/2025    HDL 36 (L) 04/19/2025    TRIG 151 (H) 04/19/2025    LDLCALC 77 04/19/2025    NONHDLC 107 04/19/2025    ICW0KNCASPRZ 0.607 04/19/2025    TREPONEMAPA Non-reactive 04/19/2025    HGBA1C 5.5 04/19/2025     04/19/2025       Administrative Statements     Counseling / Coordination of Care:   I have spent a total time of 30 minutes in caring for this patient on the day of the visit/encounter including:  Patient's progress discussed with staff in treatment team meeting.  Medication changes reviewed with staff in treatment team meeting.    Portions of the record may have been created with voice recognition software. Occasional wrong word or \"sound a like\" substitutions may have occurred due to the inherent limitations of voice recognition software. Read the chart carefully and recognize, using context, where substitutions have occurred.    TANI Hardy 04/22/25  "

## 2025-04-22 NOTE — PROGRESS NOTES
Pt has been provided with information about resources for relapse prevention and management; pt declined relapse prevention plan.

## 2025-04-22 NOTE — PROGRESS NOTES
04/21/25 1115   Team Meeting   Meeting Type Tx Team Meeting   Initial Conference Date 04/21/25   Next Conference Date 05/21/25   Team Members Present   Team Members Present Physician;Nurse;   Physician Team Member Dr. Castellano   Nursing Team Member Hernandez   Care Management Team Member Lacy   Patient/Family Present   Patient Present No  (Pt declined to meet with the treatment team.)   Patient's Family Present No     Reviewed diagnosis of psychotic disorder.  Discussed short term goals of decrease in psychotic symptoms.  Tentative discharge for Friday.  All parties are in agreement and treatment plan was signed.

## 2025-04-22 NOTE — NURSING NOTE
Pt denies any issues at this time  will come to staff if feeling unsafe, watching TV with peers scant conversation

## 2025-04-22 NOTE — CASE MANAGEMENT
Pt stopped CM in the hallway to discuss discharge planning. He stated that his mom can come and get him on Friday for discharge. Pt stated that his mom is available after 12pm if the CM will call her. CM stated that they will give her a call. CM stated that they will meet with him to sign consent forms for [facility]. Pt verbalized understanding.

## 2025-04-22 NOTE — ASSESSMENT & PLAN NOTE
30-year-old man who presented with auditory hallucinations, titrating Zyprexa to efficacy.  Patient has been utilizing Zyprexa as needed doses on top of his standing regimen and received 20 mg of Zyprexa yesterday.  Discussed increasing his standing regimen to 15 mg daily in the form of 5 mg in the morning and 10 mg at bedtime and leaving as needed dose of Zyprexa but counseled him to only use this if auditory hallucinations are worsening.    -- Continue Zyprexa to 5 mg daily and 10 mg at bedtime, consider further titration for ongoing AH

## 2025-04-22 NOTE — PLAN OF CARE
Problem: Alteration in Thoughts and Perception  Goal: Treatment Goal: Gain control of psychotic behaviors/thinking, reduce/eliminate presenting symptoms and demonstrate improved reality functioning upon discharge  Outcome: Progressing     Problem: Depression  Goal: Treatment Goal: Demonstrate behavioral control of depressive symptoms, verbalize feelings of improved mood/affect, and adopt new coping skills prior to discharge  Outcome: Progressing     Problem: Anxiety  Goal: Anxiety is at manageable level  Description: Interventions:- Assess and monitor patient's anxiety level. - Monitor for signs and symptoms (heart palpitations, chest pain, shortness of breath, headaches, nausea, feeling jumpy, restlessness, irritable, apprehensive). - Collaborate with interdisciplinary team and initiate plan and interventions as ordered.- [place] patient to unit/surroundings- Explain treatment plan- Encourage participation in care- Encourage verbalization of concerns/fears- Identify coping mechanisms- Assist in developing anxiety-reducing skills- Administer/offer alternative therapies- Limit or eliminate stimulants  Outcome: Progressing     Problem: Ineffective Coping  Goal: Participates in unit activities  Description: Interventions:- Provide therapeutic environment - Provide required programming - Redirect inappropriate behaviors   Outcome: Progressing      Psychotherapy Group Note    PATIENT'S NAME: Suzy Rodríguez  MRN:   [id number]  :   1984  ACCT. NUMBER: [id number]  DATE OF SERVICE: 20  START TIME: 11:00 AM  END TIME: 11:50 AM  FACILITATOR: Radhika Stevenson LICSW  TOPIC: MH EBP Group: Emotions Management  Adult Mental Health Day Treatment  TRACK: 5A  Telemedicine Visit: The patient's condition can be safely assessed and treated via synchronous audio and visual telemedicine encounter.      Reason for Telemedicine Visit: Services only offered telehealth    Originating Site (Patient Location): Patient's home    Distant Site (Provider Location): [facility]: [facility]    Consent:  The patient/guardian has verbally consented to: the potential risks and benefits of telemedicine (video visit) versus in person care; bill my insurance or make self-payment for services provided; and responsibility for payment of non-covered services.     Mode of Communication:  Video Conference via [employer]    As the provider I attest to compliance with applicable laws and regulations related to telemedicine.     NUMBER OF PARTICIPANTS: 2 (a third person was expected, however did not join the group)    Summary of Group / Topics Discussed:  Emotions Management: Guilt and Shame: Patients explored and shared personal experiences associated with feelings of guilt and shame.  Group explored how these feelings develop, what they mean to each individual, and how to increase acceptance and usefulness of these feelings.  Group members assisted one another to contextualize these concepts and promote healing.     Patient Session Goals / Objectives:    Discuss and review definitions and personal views/experiences with guilt and shame    Understand the differences between guilt and shame    Explore how feelings of guilt and shame impact functioning    Understand and practice strategies to manage difficult emotions and move towards healing    Understand and normalize  difficult emotions through group discussion    Understand the utility of guilt and shame    Target  unwanted  emotions for change      Patient Participation / Response:  Fully participated with the group by sharing personal reflections / insights and openly received / provided feedback with other participants.    Demonstrated understanding of topics discussed through group discussion and participation, Expressed understanding of the relevance / importance of emotions management skills at distressing times in life and Self-aware of experiences with difficult emotions, and strategies to employ to manage them    Treatment Plan:  Patient has a current master individualized treatment plan.  See Epic treatment plan for more information.    Radhika Johnson, LICSW

## 2025-04-22 NOTE — CASE MANAGEMENT
Readmit score:  26(red)   Treatment Plan:   Pt reviewed and signed treatment plan.    Confirmed Address:    [facility]: [address] [place] [address] [place] [address]    [place]   Resides in the home with:   Mom, nhi, brother, and brother's girlfriend   Will Return Home at Discharge:   Returning   Confirmed Phone Number:   [phone]   Confirmed Email Address:   [email or web address]    Marital Status:  Children: Single  None   Family/Social Supports:    History of Mental Health:  Family      Unknown   Commitment Status:    Status Changes:  201   Admitted from:   [facility] ED on 4/18/2025   Presenting C/O:         Pt stated he wants to get treatment for psychosis and medication. He stated that he has AH but no SI or HI. He stated that he called crisis and they suggested he go to the hospital.      [facility]   Past Suicide Attempts:   None   Current outpatient:    Psychiatrist:   None but interested    CM discussed referral to [facility]   Therapist:   None but interested   ACT/ICM/CPS/WRT/SC:   None but interested    CM reviewed referral for [facility]   PCP:   [facility] [place]   Med Hx/Concern:   None   Medications:   Reports taking as prescribed   Pharmacy:   [facility]   Spirituality/Yarsanism:   Church   Education:   High School   Work/Income:   None   Legal:     Probation/[place] Ofc: None   Access to Firearms:   None   Transportation:   License    Strength:   Determination   Coping Skills:   Adaptive   Goal:   Wants to get services   Referrals Needed:     OP  ICM   Transport at Discharge:   Mom   Emergency Contact:    Gemini Clayton (mother): [phone]   ROIs obtained:     Mom  [facility]  [facility]   Insurance:    [employer]   IMM:  N/a   Audit: PAWSS:  BAT/Ethanol:  UDS:    Substance Abuse: Freq. Amount Last Use Notes:   Heroin    Denying use   Amp/Meth    Denying use   Alcohol    Denying use   Cocaine    Denying use   Cannabis     Denying use   Benzodiazepine    Denying use   Barbituartes    Denying use   Other    Denying use   Tobacco    Denying use

## 2025-04-22 NOTE — PROGRESS NOTES
04/22/25 7950   Team Meeting   Meeting Type Daily Rounds   Team Members Present   Team Members Present Physician;Nurse;;Other (Discipline and Name)   Physician Team Member Dr. Castellano / Dr. Jones / TANI Main   Nursing Team Member Hernandez / Preston   Care Management Team Member Lacy / Jemima / Sera / Josue   Other (Discipline and Name) Sandra (Group Facilitator) / Risa (Pharmacist)   Patient/Family Present   Patient Present No   Patient's Family Present No     Treatment Team Rounds Completed  Medical and Psychiatric Review Completed  D/C: withdrawn to self, reporting Zyprexa is affective, discharge for Friday

## 2025-04-22 NOTE — NURSING NOTE
Patient observed in TV room and hallways, but keeps to himself. Denies SI/HI/AVH. Continues to report intermittent AH, but stated they are not bothersome at this time. Stated he was anxious because he wanted his HS medication so he could go to bed, but understood that it was too early to receive HS medication at that time. Fair appetite for meals and snack. Compliant with medication.

## 2025-04-22 NOTE — NURSING NOTE
Pt requested prn for anxiety. Tabares score 21, given prn atarax 50mg at 0957. Pt states prn was effective in reducing anxiety. Pt denies current SI. Denies hallucinations when asked. States he slept okay. States zyprexa is helping and he feels better. Noted to be out in dayroom in milieu watching TV. Quiet and soft spoken in conversation. No agitation observed.

## 2025-04-22 NOTE — NURSING NOTE
Leann requested and was given PRN Trazodone 50 mg for sleep. Upon follow-up for reassessment, patient is observed resting in bed with eyes closed, PRN appears to have been effective.

## 2025-04-22 NOTE — PLAN OF CARE
Problem: DISCHARGE PLANNING  Goal: Discharge to home or other facility with appropriate resources  Description: INTERVENTIONS:- Identify barriers to discharge w/patient and caregiver- Arrange for needed discharge resources and transportation as appropriate- Identify discharge learning needs (meds, wound care, etc.)- Arrange for interpretive services to assist at discharge as needed- Refer to Case Management Department for coordinating discharge planning if the patient needs post-hospital services based on physician/advanced practitioner order or complex needs related to functional status, cognitive ability, or social support system  Outcome: Progressing  Note: Pt met with the CM to review and sign ROIs and complete intake. Pt read and signed treatment plan.

## 2025-04-23 PROCEDURE — 99232 SBSQ HOSP IP/OBS MODERATE 35: CPT | Performed by: STUDENT IN AN ORGANIZED HEALTH CARE EDUCATION/TRAINING PROGRAM

## 2025-04-23 RX ADMIN — ACETAMINOPHEN 975 MG: 325 TABLET, FILM COATED ORAL at 14:20

## 2025-04-23 RX ADMIN — TRAZODONE HYDROCHLORIDE 50 MG: 50 TABLET ORAL at 21:04

## 2025-04-23 RX ADMIN — HYDROXYZINE HYDROCHLORIDE 50 MG: 50 TABLET, FILM COATED ORAL at 20:11

## 2025-04-23 RX ADMIN — NICOTINE 1 PATCH: 21 PATCH, EXTENDED RELEASE TRANSDERMAL at 08:18

## 2025-04-23 RX ADMIN — OLANZAPINE 5 MG: 5 TABLET, FILM COATED ORAL at 08:17

## 2025-04-23 RX ADMIN — HYDROXYZINE HYDROCHLORIDE 50 MG: 50 TABLET, FILM COATED ORAL at 08:19

## 2025-04-23 RX ADMIN — OLANZAPINE 15 MG: 10 TABLET, FILM COATED ORAL at 21:04

## 2025-04-23 RX ADMIN — LISINOPRIL 10 MG: 10 TABLET ORAL at 08:17

## 2025-04-23 NOTE — NURSING NOTE
On follow up of previous pain PRN medication adm. Pt reported 0/10 pain to [facility] at 1545.

## 2025-04-23 NOTE — NURSING NOTE
Pt in hallway on approach; Calm, cooperative and pleasant during interaction. Pt reports feeling unchanged from previous assessment; Denies SI/HI at this time. Pt feels his hallucinations have improved since being on Zyprexa and currently denies any other needs or concerns at this time. Plan of care ongoing.

## 2025-04-23 NOTE — CASE MANAGEMENT
CM called the Pt's mom Gemini Clayton @[phone] to discuss admission and discharge. CM introduced self and discussed role. CM discussed treatment and provided an update. CM stated that they are referring him to [facility] for [facility] and [facility] for ICM. CM reviewed medication. She verbalized understanding. CM stated that they will call her tomorrow to confirm final discharge planning.     CM met with the Pt to sign consents for [facility]. CM stated that they spoke to his mom and filled her in on his treatment. Pt asked if he is leaving Friday. CM confirmed Friday for discharge. CM stated that they will meet with him tomorrow to confirm discharge plans for Friday. Pt verbalized understanding.     CM emailed consents to Jayshree through [facility]. She stated that she will work on it tomorrow morning.

## 2025-04-23 NOTE — PLAN OF CARE
Problem: Alteration in Thoughts and Perception  Goal: Treatment Goal: Gain control of psychotic behaviors/thinking, reduce/eliminate presenting symptoms and demonstrate improved reality functioning upon discharge  Outcome: Progressing     Problem: Depression  Goal: Treatment Goal: Demonstrate behavioral control of depressive symptoms, verbalize feelings of improved mood/affect, and adopt new coping skills prior to discharge  Outcome: Progressing     Problem: Anxiety  Goal: Anxiety is at manageable level  Description: Interventions:- Assess and monitor patient's anxiety level. - Monitor for signs and symptoms (heart palpitations, chest pain, shortness of breath, headaches, nausea, feeling jumpy, restlessness, irritable, apprehensive). - Collaborate with interdisciplinary team and initiate plan and interventions as ordered.- [place] patient to unit/surroundings- Explain treatment plan- Encourage participation in care- Encourage verbalization of concerns/fears- Identify coping mechanisms- Assist in developing anxiety-reducing skills- Administer/offer alternative therapies- Limit or eliminate stimulants  Outcome: Progressing     Problem: DISCHARGE PLANNING  Goal: Discharge to home or other facility with appropriate resources  Description: INTERVENTIONS:- Identify barriers to discharge w/patient and caregiver- Arrange for needed discharge resources and transportation as appropriate- Identify discharge learning needs (meds, wound care, etc.)- Arrange for interpretive services to assist at discharge as needed- Refer to Case Management Department for coordinating discharge planning if the patient needs post-hospital services based on physician/advanced practitioner order or complex needs related to functional status, cognitive ability, or social support system  Outcome: Progressing     Problem: Ineffective Coping  Goal: Participates in unit activities  Description: Interventions:- Provide therapeutic environment - Provide  required programming - Redirect inappropriate behaviors   Outcome: Progressing

## 2025-04-23 NOTE — NURSING NOTE
@ 2122 pt requested and received trazodone 50 mg PO for sleep. Upon follow up pt is sleeping. PRN was effective at this time.

## 2025-04-23 NOTE — NURSING NOTE
Pt requested and recv'd prn for anxiety Tabares scale 18. Given atarax 50mg at 0819 with good effect when asked on follow up. Pt states he is planning on discharge Friday. Reports hallucinations are improving on zyprexa and they are not as bothersome as PTA. Reports sleeping well. Attending groups. Affect constricted, pt soft spoken and scant but cooperative. No agitation. Denies any thoughts of self harm.

## 2025-04-23 NOTE — PROGRESS NOTES
Progress Note - Behavioral Health   Name: Leann Mcnair 30 y.o. male I MRN: [id number]  Unit/Bed#: -01 I Date of Admission: 4/18/2025   Date of Service: 4/23/2025 I Hospital Day: 5     Assessment & Plan  Psychotic disorder (HCC)  30-year-old man who presented with auditory hallucinations, titrating Zyprexa to efficacy.  Patient has been utilizing Zyprexa as needed doses on top of his standing regimen and received 20 mg of Zyprexa yesterday.  Discussed increasing his standing regimen to 15 mg daily in the form of 5 mg in the morning and 10 mg at bedtime and leaving as needed dose of Zyprexa but counseled him to only use this if auditory hallucinations are worsening.    -- Continue Zyprexa to 5 mg daily and 15 mg at bedtime, consider further titration for ongoing AH    Current Medications:    Current Facility-Administered Medications:     lisinopril (ZESTRIL) tablet 10 mg, Oral, Daily    nicotine (NICODERM CQ) 21 mg/24 hr TD 24 hr patch 1 patch, Transdermal, Daily    OLANZapine (ZyPREXA) tablet 15 mg, Oral, HS    OLANZapine (ZyPREXA) tablet 5 mg, Oral, Daily      Risks/Benefits of Treatment:     Risks, benefits, and possible side effects of medications explained to patient and patient verbalizes understanding and agreement for treatment.    Progress Toward Goals: progressing    Treatment Planning:     All current active medications have been reviewed.  Continue to monitor response to treatment and assess for potential side effects of medications.  Encourage group therapy, milieu therapy and occupational therapy.  Collaboration with medical service for medical comorbidities as indicated.  Behavioral Health checks for safety monitoring.  Estimated Discharge Day: 4/25/2025 2 days (4/25/2025)  Legal Status : Voluntary 201 commitment.  Long Stay Certification : Not Applicable    Subjective     Behavior over the last 24 hours: improving    Per staff, Leann is visible on the unit but remains withdrawn to himself.   He received as needed Atarax for anxiety yesterday which was effective.  He also required as needed trazodone last night for sleep which was effective.  There have been no acute behavioral issues.    Leann was seen in his room today for psychiatric follow-up.  Continues to report improvement in his mood and improvement in his auditory and visual hallucinations hallucinations.  He is unable to describe his visual hallucinations. He admits to some ongoing anxiety symptoms which as needed Atarax has been effective for.  He also reports some trouble falling asleep at night and trazodone has been helpful for that.  He is agreeable to increasing this Zyprexa tonight for his ongoing auditory and visual hallucinations which also may help with sleep and further mood stabilization.  He notes some improvement in his ability to concentrate.  He is denying any side effects from his medication.  He is denying and suicidal or homicidal ideation intent or plan.     Sleep: difficulty falling asleep  Appetite: normal  Medication side effects: No  ROS: review of systems as noted above in HPI/Subjective report, all other systems are negative    Objective :  Temp:  [97.7 °F (36.5 °C)-98 °F (36.7 °C)] 98 °F (36.7 °C)  HR:  [72-91] 72  BP: (123-156)/(82-98) 123/82  Resp:  [18] 18  SpO2:  [98 %-99 %] 98 %  O2 Device: None (Room air)    Mental Status Evaluation:    Appearance:  casually dressed, marginal hygiene   Behavior:  cooperative   Speech:  soft, delayed at times   Mood:  anxious   Affect:  constricted   Thought Process:  slowing of thoughts   Thought Content:  ruminating thoughts   Perceptual Disturbances: vague auditory hallucinations, vague visual hallucinations   Risk Potential: Suicidal Ideation -  None  Homicidal Ideation -  None  Potential for Aggression - No   Sensorium:  oriented to person, place, and time/date   Memory:  recent memory intact   Consciousness:  alert and awake   Attention/Concentration: attention span and  "concentration appear shorter than expected for age   Insight:  partial   Judgment: partial   Gait/Station: normal gait/station   Motor Activity: no abnormal movements       Lab Results: I have reviewed the following results:  Results from the past 24 hours: No results found for this or any previous visit (from the past 24 hours).  Most Recent Labs:   Lab Results   Component Value Date    WBC 5.86 04/19/2025    RBC 5.27 04/19/2025    HGB 15.0 04/19/2025    HCT 45.3 04/19/2025     04/19/2025    RDW 13.0 04/19/2025    NEUTROABS 2.63 04/19/2025    TOTANEUTABS 13.64 (H) 03/21/2025    SODIUM 139 04/19/2025    K 4.0 04/19/2025     04/19/2025    CO2 29 04/19/2025    BUN 9 04/19/2025    CREATININE 1.06 04/19/2025    GLUC 92 04/19/2025    CALCIUM 9.5 04/19/2025    AST 16 04/19/2025    ALT 15 04/19/2025    ALKPHOS 42 04/19/2025    TP 7.2 04/19/2025    ALB 4.2 04/19/2025    TBILI 0.61 04/19/2025    CHOLESTEROL 143 04/19/2025    HDL 36 (L) 04/19/2025    TRIG 151 (H) 04/19/2025    LDLCALC 77 04/19/2025    NONHDLC 107 04/19/2025    VRU9YULAHEDS 0.607 04/19/2025    TREPONEMAPA Non-reactive 04/19/2025    HGBA1C 5.5 04/19/2025     04/19/2025       Administrative Statements     Counseling / Coordination of Care:   I have spent a total time of 30 minutes in caring for this patient on the day of the visit/encounter including:  Patient's progress discussed with staff in treatment team meeting.  Medication changes reviewed with staff in treatment team meeting.    Portions of the record may have been created with voice recognition software. Occasional wrong word or \"sound a like\" substitutions may have occurred due to the inherent limitations of voice recognition software. Read the chart carefully and recognize, using context, where substitutions have occurred.    TANI Hardy 04/23/25  "

## 2025-04-23 NOTE — ASSESSMENT & PLAN NOTE
30-year-old man who presented with auditory hallucinations, titrating Zyprexa to efficacy.  Patient has been utilizing Zyprexa as needed doses on top of his standing regimen and received 20 mg of Zyprexa yesterday.  Discussed increasing his standing regimen to 15 mg daily in the form of 5 mg in the morning and 10 mg at bedtime and leaving as needed dose of Zyprexa but counseled him to only use this if auditory hallucinations are worsening.    -- Continue Zyprexa to 5 mg daily and 15 mg at bedtime, consider further titration for ongoing AH

## 2025-04-23 NOTE — PROGRESS NOTES
04/23/25 0750   Team Meeting   Meeting Type Daily Rounds   Team Members Present   Team Members Present Physician;Nurse;;Other (Discipline and Name)   Physician Team Member Dr. Castellano / MARVA Baca / TANI Main / [place] Student   Nursing Team Member Hernandez / Preston   Care Management Team Member Lacy / Jemima / Sera   Other (Discipline and Name) Miguelund (Group Facilitator)   Patient/Family Present   Patient Present No   Patient's Family Present No     Treatment Team Rounds Completed  Medical and Psychiatric Review Completed  D/C: visibile, reporting improvements since admission, some anxiety, discharge for Friday

## 2025-04-24 PROCEDURE — 99232 SBSQ HOSP IP/OBS MODERATE 35: CPT | Performed by: STUDENT IN AN ORGANIZED HEALTH CARE EDUCATION/TRAINING PROGRAM

## 2025-04-24 RX ADMIN — BENZTROPINE MESYLATE 1 MG: 1 TABLET ORAL at 12:46

## 2025-04-24 RX ADMIN — OLANZAPINE 15 MG: 10 TABLET, FILM COATED ORAL at 21:00

## 2025-04-24 RX ADMIN — TRAZODONE HYDROCHLORIDE 50 MG: 50 TABLET ORAL at 21:00

## 2025-04-24 RX ADMIN — NICOTINE 1 PATCH: 21 PATCH, EXTENDED RELEASE TRANSDERMAL at 08:22

## 2025-04-24 RX ADMIN — LISINOPRIL 10 MG: 10 TABLET ORAL at 08:21

## 2025-04-24 RX ADMIN — OLANZAPINE 5 MG: 5 TABLET, FILM COATED ORAL at 08:21

## 2025-04-24 RX ADMIN — BENZTROPINE MESYLATE 1 MG: 1 TABLET ORAL at 16:44

## 2025-04-24 NOTE — PLAN OF CARE
Problem: Alteration in Thoughts and Perception  Goal: Treatment Goal: Gain control of psychotic behaviors/thinking, reduce/eliminate presenting symptoms and demonstrate improved reality functioning upon discharge  Outcome: Progressing     Problem: Depression  Goal: Treatment Goal: Demonstrate behavioral control of depressive symptoms, verbalize feelings of improved mood/affect, and adopt new coping skills prior to discharge  Outcome: Progressing     Problem: Anxiety  Goal: Anxiety is at manageable level  Description: Interventions:- Assess and monitor patient's anxiety level. - Monitor for signs and symptoms (heart palpitations, chest pain, shortness of breath, headaches, nausea, feeling jumpy, restlessness, irritable, apprehensive). - Collaborate with interdisciplinary team and initiate plan and interventions as ordered.- [place] patient to unit/surroundings- Explain treatment plan- Encourage participation in care- Encourage verbalization of concerns/fears- Identify coping mechanisms- Assist in developing anxiety-reducing skills- Administer/offer alternative therapies- Limit or eliminate stimulants  Outcome: Progressing     Problem: Ineffective Coping  Goal: Participates in unit activities  Description: Interventions:- Provide therapeutic environment - Provide required programming - Redirect inappropriate behaviors   Outcome: Progressing     Problem: DISCHARGE PLANNING  Goal: Discharge to home or other facility with appropriate resources  Description: INTERVENTIONS:- Identify barriers to discharge w/patient and caregiver- Arrange for needed discharge resources and transportation as appropriate- Identify discharge learning needs (meds, wound care, etc.)- Arrange for interpretive services to assist at discharge as needed- Refer to Case Management Department for coordinating discharge planning if the patient needs post-hospital services based on physician/advanced practitioner order or complex needs related to  functional status, cognitive ability, or social support system  Outcome: Progressing

## 2025-04-24 NOTE — NURSING NOTE
"Pt reports doing good today. Pt feels ready to discharge. Pt reports feeling bored today. When asked about hallucinations pt stated, \"Not really.\" Pt reports good sleep and appetite. Denies SI/HI. OOB for meals and medications. Pt attended groups today. Denies unmet needs at this time.   "

## 2025-04-24 NOTE — PROGRESS NOTES
04/24/25 0830   Team Meeting   Meeting Type Daily Rounds   Team Members Present   Team Members Present Physician;Nurse;   Physician Team Member Dr. Castellano / MARVA Baca / Dr. Davis / [place] Student   Nursing Team Member Ayde / Preston / Nursing Students   Care Management Team Member Lacy / Jemima / Sera   Patient/Family Present   Patient Present No   Patient's Family Present No     Treatment Team Rounds Completed  Medical and Psychiatric Review Completed  D/C: pleasant, reporting improvement since admission and with Zyprexa, discharge for tomorrow

## 2025-04-24 NOTE — DISCHARGE INSTR - OTHER ORDERS
[facility] CRISIS INFORMATION     Warmline is a confidential 7 days/week telephone support service manned by trained mental health consumers.  Warmline operates daily but is not able to accept calls between 2AM-6AM.   Warmline provides support, a listening ear and can provide information about available services. Warmline specializes in the concerns of mental health consumers, their families and friends.  However, we are also here for anyone who has a mental health concern, is confused about or just doesn't know anything about mental health or where to get information. To reach [facility], call [phone] accepts calls between 6:00 AM to 10:00 AM and from 4:00 PM to 12:00 AM.     Text CONNECT to 015223 from anywhere in the [place], anytime, about any type of crisis.  A live, trained Crisis Counselor receives the text and lets you know that they are here to listen.  The volunteer Crisis Counselor will help you move from a hot moment to a cool moment.    [facility] - licensed telephone and mobile crisis services that provide mental health assessments to all age groups regardless of income or insurance.  Crisis Intervention operates 24-hour/7 days a week. [facility] assists consumer who are experiencing a mental health emergency and lack the resources to assist themselves.  Immediate intervention for suicidal and depressed individuals with home visits/outreach being top priority. Crisis can be contacted at [phone].     The National [place] on Mental Illness (HAYLEY) offers various education & support groups for you & your family.  For more information visit their website at   http://www.hayley-lv.org/.  Dial 2-1-1 to get connected/get help.  Free, confidential information & referral available 24/7: Aging Services, Child & Youth Services, Counseling, Education/Training, Food/Shelter/Clothing, Health Services, Parenting, Substance Abuse, Support Groups, Volunteer  Opportunities, & much more.  Phone: 2-1-1 or 401-529-4986, Web: www.iv750cfsg.org, Email: 211@[facility].org    [place] Drug and Alcohol Administration Resources   (609) 455-5134  For additional information, contact the Department of Human Services Information and Referral Unit at  (501) 333-4650 between the hours of 8:30 a.m. and 4:30 p.m., Monday through Friday.    An assessment is the first step in the process for [place] residents to get the assistance they need.  Any of the providers listed below are able to complete an assessment.  After contacting a provider, an appointment for the assessment is scheduled.  During the appointment an  will gather information to determine the level of treatment that is needed.  In addition, assistance will be provided in completing the necessary paperwork to access treatment including a liability determination, release(s) of information, and may also include an application to the Department of Public Welfare for Medical Assistance.  The assessment process may take up to 2 hours to complete.  Upon completion of the paperwork and assessment process, the  will determine the recommended level of care needed and provide assistance with the referral process.     American Organization - provides substance abuse assessment services for adults.  [address] [place] [address]  Phone: [phone] ext. [phone]  Fax: [phone]  Walk in hours Mondays - Fridays 8AM-3PM     [facility] ([facility]) - provides screening and assessment, outpatient and intensive outpatient services for both adolescents and adults.  Day and evening services available.  [address] [place] [address]  Phone: [phone]  Fax: [phone]     [facility]. [facility] - is a dual diagnosis outpatient program that provides assessment / treatment recommendations, individual, group and  family therapy, intensive outpatient therapy, outpatient therapy, professional consultations, educational presentations and workshops, and urine screens for drugs of abuse.    [facility]  [address]  [place] [address]  Phone: [phone]  Fax: [phone]  Walk in hours Mondays 9AM-5PM and Tuesdays - Fridays 9AM-2PM     Treatment [employer] [facility] - provides intensive outpatient and outpatient treatment services to adolescents, adults and families experiencing drug and/or alcohol problems.  Treatment modalities include individual, group and family counseling.  Weekend DUI classes are available.  [employer] is a division of [employer].  [facility] [place] [address]  Phone: [phone]  Fax: [phone]  Walk in hours Mondays - Fridays 8:30AM-3:30PM      Primary Care Doctor Services    When your insurance becomes active Medical Assistance will send you paperwork to choose your primary care doctor.    If you should need services before that below are options that work with uninsured patients.     [facility] provides comprehensive well and sick primary care, OB/GYN, dental and pediatric health services to the medically underserved, including the uninsured and underinsured. [facility] provides services in a community-based setting with experienced and compassionate physicians and other providers. It supports the health and wellness goals of local residents and specializes in providing improved access, coordinated care and enhanced patient / family involvement.  [address]  [address]  [phone]  Monday - Friday: 8 am - 5 pm  We provide care in a variety of areas, including: routine physical exams, wellness visits for infants through adults, including children's immunizations. In addition, our services include blood tests and lab  studies; women's health care, including Pap smears; care and management of diabetes, asthma and high blood pressure.    [facility]  [facility]  [address]  [address]  [facility] [address]  [phone]  Monday through Friday: 8 am - 5 pm  Saturday: 8 am - 1 pm  Health screenings  Preventive care  Care for acute illnesses and minor problems  Care of chronic illnesses  Physical examinations, including gynecological exams and Pap smears  Patient education  Immunizations  We provide care in a variety of areas, including: routine physical exams, wellness visits for infants through adults, including children's immunizations. In addition, our services include blood tests and lab studies; women's health care, including Pap smears; care and management of diabetes, asthma and high blood pressure.    [address]  [place]  [address]  [phone]  Monday - Friday: 8 am - 5 pm  Health screenings  Preventive care  Care for acute illnesses and minor problems  Care of chronic illnesses  Physical examinations  Patient education  Immunizations  We offer assistance in accessing various resources through our , Financial Counselor and Outpatient Care Manager. In addition, we have a Certified  on site to help facilitate optimal communication with patients, care and management of diabetes and other chronic illnesses, including transitioning from hospital to home through specialized office visits. We can perform many point-of-care tests at the time of your visit including, EKG's, Hemoglobin A1C, blood glucose fingerstick, diabetic eye exams, urine dip, spirometry and hearing/vision tests.    [facility]   LOCATION:  [facility] [place]  (located inside [facility])  [address] [address]  PHONE:  [phone]  HOURS:  Monday: 8 am - 8 pm  Tuesday: 8 am - 8 pm  Wednesday: 8 am - 4:30 pm  Thursday: 8 am - 8 pm  Friday: 8 am - 6 pm  Saturday: Closed  Sunday: Closed    [facility]  LOCATION:  [address] [place] [address]  PHONE: [phone]  HOURS:  Monday: 8 am - 4:30 pm  Tuesday: 8 am - 4:30 pm  Wednesday: 8 am - 4:30 pm  Thursday: 8 am - 4:30 pm  Friday: 8 am - 4:30 pm  Saturday: Closed  Sunday: Closed    [facility]  (inside [facility])  [address] [place] [address]  PHONE: [phone]  HOURS:  Monday: 8 am - 8 pm  Tuesday: 8 am - 4:30 pm  Wednesday: 8 am - 4:30 pm  Thursday: 8 am - 8 pm  Friday: 8 am - 4:30 pm  Saturday: Closed  Sunday: Closed    [facility]  LOCATION:  [facility]  [address] [place] [address]  PHONE: [phone]  HOURS:  Please note that our hours have changed.  Monday: 8 am - 8 pm  Tuesday: 8 am - 8 pm  Wednesday: 8 am - 4:30 pm  Thursday: 8 am - 8 pm  Friday: 8 am - 6 pm  Saturday: Closed  Sunday: Closed    [facility] MEDICINE RESOURCE GUIDE     Itz is the main contact from [facility] and her direct number is [phone], her email contact is [email or web address].     [facility] W[facility]  Season runs November 1, 2021 through April 30, 2022. From November 1 through November 30   shelter will only be operating when it is 32 degrees or below. Visit website for status update.  Winter shelter for single men and single women. Registration 7:00pm to 9:00pm (Only employed   guests with written proof of employment may enter station later). First come, first served. Lights   out at 10:30pm. Lights on at 5:45am. Meals will be served Monday through Friday for clients   staying there only. Clients are required to wear a mask with the exception of eating and sleeping.  Address:  [address] [place] [address]  [place]  [place] [address]  Eligibility: Homeless single men and women 18 years and older who have no other shelter   options; Unable to serve families Must have no open criminal warrants or sexual offender status   found on background check through [employer],  and [address].  Hours: Monday through Sunday, 7:00pm to 7:00am  Phone: [phone]      [employer] 2-1-1:   Call: 211 or 922-949-4160 Website: http://www.Linkurious/  This is a toll free, confidential; 24-hour-a-day service which connects you to a community  in your area who can help you find services and resources that are available to you locally and provide critical services that can improve and save lives.     National Suicide Prevention Hotline  1-436.700.1127    National de Prevencion del Suicidio  6-361-412-1478    [place] Drug & Alcohol Helpline  1-657.213.1679    Crisis Text Line is free, 24/7 support for those in crisis. Text HOME to 819024 from anywhere in the [place] to text with a trained Crisis Counselor

## 2025-04-24 NOTE — CASE MANAGEMENT
KAE received a call from [facility] through [facility] to schedule an intake for [facility]. She scheduled the Pt for Monday 4/28/2025 at 12:30pm with  Mei.     CM emailed ICM referral to [facility] supervisor Mayuri.    CM met with the Pt to discuss discharge planning for tomorrow. CM informed him of his follow up with [facility] and [employer] [facility]. CM stated that they will call his mom today to discuss discharge. He stated that he is going to call her too. CM stated that he can be picked up tomorrow between 10am and 11am. Pt verbalized understanding.     KAE called the Pt's mom Gemini Clayton @[phone] to discuss discharge planning. CM left a voicemail.

## 2025-04-24 NOTE — NURSING NOTE
Pt calm during interaction. Looking forward to discharge tomorrow. Lives with mother. Pt denies SI/HI or hallucination. Feels improved.

## 2025-04-24 NOTE — DISCHARGE INSTR - APPOINTMENTS
You have confirmed and will be discharged to address [address] [place] [address].  You have confirmed and will be contacted at phone number [phone].   Your  while you were at [facility] was Collin RIOS who can be reached at phone number [phone] and fax number [phone].    Behavioral Health Nurse Navigator, Sintia or Val will be calling you after your discharge, on the phone number that you provided.  They will be available as an additional support, if needed.   If you wish to speak with Sintia, you may contact her at [phone].

## 2025-04-24 NOTE — NURSING NOTE
Trazodone given at 2104 to help facilitate sleep. Upon follow up pt is resting in bed with even and unlabored respirations. PRN trazodone appears effective.

## 2025-04-24 NOTE — PROGRESS NOTES
Progress Note - Behavioral Health     Name: Leann Mcnair 30 y.o. male I MRN: [id number]   Unit/Bed#: -01 I Date of Admission: 4/18/2025   Date of Service: 4/24/2025 I Hospital Day: 6         Assessment & Plan  Psychotic disorder (HCC)  30-year-old man who presented with auditory hallucinations, titrating Zyprexa to efficacy.  Patient has been utilizing Zyprexa as needed doses on top of his standing regimen and received 20 mg of Zyprexa yesterday.  Discussed increasing his standing regimen to 15 mg daily in the form of 5 mg in the morning and 10 mg at bedtime and leaving as needed dose of Zyprexa but counseled him to only use this if auditory hallucinations are worsening.    -- Continue Zyprexa to 5 mg daily and 15 mg at bedtime, consider further titration for ongoing AH     Principal Problem:    Psychotic disorder (HCC)  Active Problems:    Medical clearance for psychiatric admission    HTN (hypertension)       Recommended Treatment:     Planned medication and treatment changes:    All current active medications have been reviewed  Encourage group therapy, milieu therapy and occupational therapy  Behavioral Health checks every 15 minutes  On a 201 commitment status  Continue current medications:    Current medications:  Current Facility-Administered Medications   Medication Dose Route Frequency Provider Last Rate    acetaminophen  650 mg Oral Q6H PRN Jake Carr PA-C      acetaminophen  650 mg Oral Q4H PRN Jake Carr PA-C      acetaminophen  975 mg Oral Q6H PRN Jake Carr PA-C      aluminum-magnesium hydroxide-simethicone  30 mL Oral Q4H PRN Jake Carr PA-C      artificial tear   Both Eyes 4x Daily PRN Jake Carr PA-C      benztropine  1 mg Intramuscular BID PRN Jake Carr PA-C      benztropine  1 mg Oral BID PRN Jake aCrr PA-C      bisacodyl  10 mg Rectal Daily PRN Jake Carr PA-C      hydrOXYzine HCL  50 mg Oral Q6H PRN Max 4/day Jake Carr PA-C      Or  "   diphenhydrAMINE  50 mg Intramuscular Q6H PRN [facility], PA-C      hydrOXYzine HCL  100 mg Oral Q6H PRN Max 4/day [facility], MARVA      Or    LORazepam  2 mg Intramuscular Q6H PRN [facility], PA-C      hydrOXYzine HCL  25 mg Oral Q6H PRN Max 4/day [facility], PA-C      lisinopril  10 mg Oral Daily Alondra Hawkins, PA-C      magnesium hydroxide  30 mL Oral Daily PRN [facility], PA-C      nicotine  1 patch Transdermal Daily [facility], PA-C      nicotine polacrilex  4 mg Oral Q2H PRN [facility], PA-C      OLANZapine  5 mg Oral Q3H PRN Max 6/day [facility], PA-C      Or    OLANZapine  5 mg Intramuscular Q3H PRN Max 6/day [facility], PA-C      OLANZapine  15 mg Oral HS TANI Hardy      OLANZapine  2.5 mg Oral Q3H PRN Max 8/day [facility], PA-C      OLANZapine  5 mg Oral Daily Laureano Aburto MD      propranolol  10 mg Oral Q8H PRN [facility], PA-LOGAN      senna-docusate sodium  1 tablet Oral Daily PRN [facility], PA-C      traZODone  50 mg Oral HS PRN [facility], PA-LOGAN         Behavior over the last 24 hours: improving.     Patient was seen, chart was reviewed/discussed with the treatment team and interviewed on the unit. Per nursing, patient is visible on the unit, attending groups and compliant with medications and meals. Upon approach, patient was walking the halls, was calm and cooperative. He reports mood as: \"settled.\" He reports significant improvement in AH/VH and currently denies any psychosis. Slept better last night. Appetite is adequate. Denies any side effects and unmet needs. He denies SI/HI       Sleep: slept better  Appetite: normal  Medication side effects: No   ROS: no complaints    Mental Status Evaluation:    Appearance:  dressed appropriately, adequate grooming, looks stated age   Behavior:  pleasant, cooperative, calm   Speech:  normal rate and volume   Mood:  \"Settled\"   Affect:  normal range and intensity " "  Thought Process:  organized, logical, goal directed   Associations: intact associations   Thought Content:  no overt delusions   Perceptual Disturbances: no auditory hallucinations, visual hallucinations of \"bright white light on the side of my eyes\"   Risk Potential: Suicidal ideation - None  Homicidal ideation - None  Potential for aggression - No   Sensorium:  oriented to person, place, and time/date   Memory:  recent and remote memory grossly intact   Consciousness:  alert and awake   Attention/Concentration: attention span and concentration are age appropriate   Insight:  age appropriate   Judgment: age appropriate   Gait/Station: normal gait/station   Motor Activity: no abnormal movements     Vital signs in last 24 hours:    Temp:  [97.9 °F (36.6 °C)-99.1 °F (37.3 °C)] 97.9 °F (36.6 °C)  HR:  [91-96] 91  BP: (111-147)/(81-91) 111/81  Resp:  [18] 18  SpO2:  [99 %] 99 %  O2 Device: None (Room air)    Laboratory results: I have personally reviewed all pertinent laboratory/tests results    Results from the past 24 hours: No results found for this or any previous visit (from the past 24 hours).    Progress Toward Goals: progressing    Risks / Benefits of Treatment:    Risks, benefits, and possible side effects of medications explained to patient and patient verbalizes understanding and agreement for treatment.    Counseling / Coordination of Care:    Patient's progress discussed with staff in treatment team meeting.  Medications, treatment progress and treatment plan reviewed with patient.    TANI Acosta 04/24/25    This note was constructed with the assistance of network approved dictation software. Please excuse any minor errors of syntax or grammar as a result.      "

## 2025-04-24 NOTE — PLAN OF CARE
Pt attends select groups and unit activities    Problem: Ineffective Coping  Goal: Participates in unit activities  Description: Interventions:- Provide therapeutic environment - Provide required programming - Redirect inappropriate behaviors   Outcome: Progressing

## 2025-04-24 NOTE — NURSING NOTE
Pt requested atarax at 2010 for HAM of 19. Upon f/u pt eating snack in dining room. PRN atarax appears effective at this time.

## 2025-04-24 NOTE — ASSESSMENT & PLAN NOTE
30-year-old man who presented with auditory hallucinations, titrating Zyprexa to efficacy.  Patient has been utilizing Zyprexa as needed doses on top of his standing regimen and received 20 mg of Zyprexa yesterday.  Discussed increasing his standing regimen to 15 mg daily in the form of 5 mg in the morning and 10 mg at bedtime and leaving as needed dose of Zyprexa but counseled him to only use this if auditory hallucinations are worsening.    -- Continue Zyprexa to 5 mg daily and 15 mg at bedtime, consider further titration for ongoing AH   Yes, he may return to work when requested as long as he is afebrile and feeling better.

## 2025-04-25 VITALS
WEIGHT: 178.8 LBS | SYSTOLIC BLOOD PRESSURE: 121 MMHG | DIASTOLIC BLOOD PRESSURE: 82 MMHG | BODY MASS INDEX: 23.7 KG/M2 | RESPIRATION RATE: 17 BRPM | HEART RATE: 71 BPM | TEMPERATURE: 97.1 F | HEIGHT: 73 IN | OXYGEN SATURATION: 96 %

## 2025-04-25 PROCEDURE — 99239 HOSP IP/OBS DSCHRG MGMT >30: CPT | Performed by: STUDENT IN AN ORGANIZED HEALTH CARE EDUCATION/TRAINING PROGRAM

## 2025-04-25 RX ORDER — BENZTROPINE MESYLATE 1 MG/1
1 TABLET ORAL DAILY
Qty: 30 TABLET | Refills: 0 | Status: SHIPPED | OUTPATIENT
Start: 2025-04-25 | End: 2025-05-25

## 2025-04-25 RX ORDER — HYDROXYZINE HYDROCHLORIDE 25 MG/1
25 TABLET, FILM COATED ORAL 2 TIMES DAILY PRN
Qty: 30 TABLET | Refills: 0 | Status: SHIPPED | OUTPATIENT
Start: 2025-04-25 | End: 2025-05-25

## 2025-04-25 RX ORDER — OLANZAPINE 15 MG/1
15 TABLET, FILM COATED ORAL
Qty: 30 TABLET | Refills: 0 | Status: SHIPPED | OUTPATIENT
Start: 2025-04-25 | End: 2025-05-25

## 2025-04-25 RX ORDER — OLANZAPINE 5 MG/1
5 TABLET, FILM COATED ORAL DAILY
Qty: 30 TABLET | Refills: 0 | Status: SHIPPED | OUTPATIENT
Start: 2025-04-26 | End: 2025-05-26

## 2025-04-25 RX ORDER — LISINOPRIL 5 MG/1
10 TABLET ORAL DAILY
Qty: 60 TABLET | Refills: 0 | Status: SHIPPED | OUTPATIENT
Start: 2025-04-25 | End: 2025-05-25

## 2025-04-25 RX ADMIN — LISINOPRIL 10 MG: 10 TABLET ORAL at 08:04

## 2025-04-25 RX ADMIN — OLANZAPINE 5 MG: 5 TABLET, FILM COATED ORAL at 08:04

## 2025-04-25 RX ADMIN — NICOTINE 1 PATCH: 21 PATCH, EXTENDED RELEASE TRANSDERMAL at 08:05

## 2025-04-25 NOTE — NURSING NOTE
AVS reviewed with pt. Belongings packed with staff, all personal belongings accounted for. Medications sent via e-prescribe. Pt expresses readiness for discharge. Transport provided by mother.

## 2025-04-25 NOTE — PLAN OF CARE
Problem: Alteration in Thoughts and Perception  Goal: Treatment Goal: Gain control of psychotic behaviors/thinking, reduce/eliminate presenting symptoms and demonstrate improved reality functioning upon discharge  Outcome: Progressing     Problem: Depression  Goal: Treatment Goal: Demonstrate behavioral control of depressive symptoms, verbalize feelings of improved mood/affect, and adopt new coping skills prior to discharge  Outcome: Progressing     Problem: Anxiety  Goal: Anxiety is at manageable level  Description: Interventions:- Assess and monitor patient's anxiety level. - Monitor for signs and symptoms (heart palpitations, chest pain, shortness of breath, headaches, nausea, feeling jumpy, restlessness, irritable, apprehensive). - Collaborate with interdisciplinary team and initiate plan and interventions as ordered.- [place] patient to unit/surroundings- Explain treatment plan- Encourage participation in care- Encourage verbalization of concerns/fears- Identify coping mechanisms- Assist in developing anxiety-reducing skills- Administer/offer alternative therapies- Limit or eliminate stimulants  Outcome: Progressing     Problem: DISCHARGE PLANNING  Goal: Discharge to home or other facility with appropriate resources  Description: INTERVENTIONS:- Identify barriers to discharge w/patient and caregiver- Arrange for needed discharge resources and transportation as appropriate- Identify discharge learning needs (meds, wound care, etc.)- Arrange for interpretive services to assist at discharge as needed- Refer to Case Management Department for coordinating discharge planning if the patient needs post-hospital services based on physician/advanced practitioner order or complex needs related to functional status, cognitive ability, or social support system  Outcome: Progressing     Problem: Ineffective Coping  Goal: Participates in unit activities  Description: Interventions:- Provide therapeutic environment - Provide  required programming - Redirect inappropriate behaviors   Outcome: Progressing

## 2025-04-25 NOTE — PLAN OF CARE
Problem: Alteration in Thoughts and Perception  Goal: Treatment Goal: Gain control of psychotic behaviors/thinking, reduce/eliminate presenting symptoms and demonstrate improved reality functioning upon discharge  Outcome: Adequate for Discharge     Problem: Depression  Goal: Treatment Goal: Demonstrate behavioral control of depressive symptoms, verbalize feelings of improved mood/affect, and adopt new coping skills prior to discharge  Outcome: Adequate for Discharge     Problem: Anxiety  Goal: Anxiety is at manageable level  Description: Interventions:- Assess and monitor patient's anxiety level. - Monitor for signs and symptoms (heart palpitations, chest pain, shortness of breath, headaches, nausea, feeling jumpy, restlessness, irritable, apprehensive). - Collaborate with interdisciplinary team and initiate plan and interventions as ordered.- [place] patient to unit/surroundings- Explain treatment plan- Encourage participation in care- Encourage verbalization of concerns/fears- Identify coping mechanisms- Assist in developing anxiety-reducing skills- Administer/offer alternative therapies- Limit or eliminate stimulants  Outcome: Adequate for Discharge     Problem: DISCHARGE PLANNING  Goal: Discharge to home or other facility with appropriate resources  Description: INTERVENTIONS:- Identify barriers to discharge w/patient and caregiver- Arrange for needed discharge resources and transportation as appropriate- Identify discharge learning needs (meds, wound care, etc.)- Arrange for interpretive services to assist at discharge as needed- Refer to Case Management Department for coordinating discharge planning if the patient needs post-hospital services based on physician/advanced practitioner order or complex needs related to functional status, cognitive ability, or social support system  Outcome: Adequate for Discharge     Problem: Ineffective Coping  Goal: Participates in unit activities  Description:  Interventions:- Provide therapeutic environment - Provide required programming - Redirect inappropriate behaviors   Outcome: Adequate for Discharge

## 2025-04-25 NOTE — PLAN OF CARE
Problem: DISCHARGE PLANNING  Goal: Discharge to home or other facility with appropriate resources  Description: INTERVENTIONS:- Identify barriers to discharge w/patient and caregiver- Arrange for needed discharge resources and transportation as appropriate- Identify discharge learning needs (meds, wound care, etc.)- Arrange for interpretive services to assist at discharge as needed- Refer to Case Management Department for coordinating discharge planning if the patient needs post-hospital services based on physician/advanced practitioner order or complex needs related to functional status, cognitive ability, or social support system  Outcome: Adequate for Discharge  Note: Pt is scheduled to discharge today to return to his parent's residence with [facility] through [facility] and [facility] through [facility].

## 2025-04-25 NOTE — CASE MANAGEMENT
CM met with the Pt to discuss discharge planning for today. CM reviewed follow up with [employer] and [facility]. CM suggested calling [facility] to schedule if he doesn't hear from them. Pt verbalized understanding and readiness for discharge. Pt confirmed that his mom is coming to pick him up.

## 2025-04-25 NOTE — BH TRANSITION RECORD
Contact Information: If you have any questions, concerns, pended studies, tests and/or procedures, or emergencies regarding your inpatient behavioral health visit. Please contact [place] behavioral [facility] [phone] and ask to speak to a , nurse or physician. A contact is available 24 hours/ 7 days a week at this number.     Summary of Procedures Performed During your Stay:  Below is a list of major procedures performed during your hospital stay and a summary of results:  - No major procedures performed.    Pending Studies (From admission, onward)      None          Please follow up on the above pending studies with your PCP and/or referring provider.

## 2025-04-25 NOTE — PROGRESS NOTES
04/25/25 0750   Team Meeting   Meeting Type Daily Rounds   Team Members Present   Team Members Present Physician;Nurse;;Other (Discipline and Name)   Physician Team Member Dr. Castellano / MARVA Baca / Dr. Jones / [facility] Student   Nursing Team Member Hernnadez / Preston   Care Management Team Member Lacy / Sera   Other (Discipline and Name) Sandra (Group Facilitator)   Patient/Family Present   Patient Present No   Patient's Family Present No     Treatment Team Rounds Completed  Medical and Psychiatric Review Completed  D/C: withdrawn, denying all, discharge for today to return to his parent's residence with [facility] through [employer] and ICM through [facility]

## 2025-04-25 NOTE — NURSING NOTE
@ 2100 pt requested and received trazodone 50 mg PO for sleep. Upon follow up pt is sleeping. PRN was effective at this time.

## 2025-04-25 NOTE — DISCHARGE SUMMARY
Discharge Summary - Behavioral Health   Name: Leann Mcnair 30 y.o. male I MRN: [id number]  Unit/Bed#: -01 I Date of Admission: 4/18/2025   Date of Service: 4/25/2025 I Hospital Day: 7    Assessment & Plan  Psychotic disorder (HCC)  30-year-old man who presented with auditory hallucinations, titrating Zyprexa to efficacy.  Patient has been utilizing Zyprexa as needed doses on top of his standing regimen and received 20 mg of Zyprexa yesterday.  Discussed increasing his standing regimen to 15 mg daily in the form of 5 mg in the morning and 10 mg at bedtime and leaving as needed dose of Zyprexa but counseled him to only use this if auditory hallucinations are worsening.    -- Continue Zyprexa to 5 mg daily and 15 mg at bedtime, consider further titration for ongoing AH    Admission Date: 4/18/2025       Discharge Date: 4/25/2025 10:11 AM  Attending Psychiatrist: Dr. Castellano    Admission Diagnosis:  Principal Problem:    Psychotic disorder (HCC)  Active Problems:    Medical clearance for psychiatric admission    HTN (hypertension)    Discharge Diagnosis:   Principal Problem:    Psychotic disorder (HCC)  Active Problems:    Medical clearance for psychiatric admission    HTN (hypertension)  Resolved Problems:    * No resolved hospital problems. *    Medical Problems       Resolved Problems  Date Reviewed: 4/25/2025   None         Reason for Admission/HPI:     Per initial H&P by Dr. Worley:    Per ED provider note:30-year-old male presents the ED for psychiatric evaluation. Reports auditory and visual hallucinations, ongoing but worsening over the last few months. He is on Seroquel, states it has not been helping, states hallucinations are to the point that they are affecting his ADLs. He denies any SI, HI, or thoughts on himself or others. States he would like to go inpatient for psych. Patient evaluated by crisis, 201 signed.      Per Crisis worker note:The patient is a 31 y/o M who was self-referred for  "auditory and visual hallucinations that have interfered with his ability to find work and to keep a job. He has a history of depression, social anxiety, ADD and psychosis. No history of self-injurous behavior. History of 1 suicide attempt in 2012 by overdose of medication. Patient presents as guarded and hypervigilant. He is continuously glancing at the glass door to his room. Patient stated he has been having difficulty sleeping and decreased appetite. \"It isn't even a thought to get something to eat.\" He has been moderately depressed with no current suicidal thoughts. He stated it has been difficult to go into the community and to function, as he is not comfortable around people. Patient stated he cannot discern if what he is seeing, hearing and thinking is due to his mental health or is reality. \"I can't tell the difference.\" He describes inability to concentrate, feeling anxious and feeling guilty because he is unemployed. He has been caring for himself minimally due to poor motivation and decreased energy. He stated he has been on Lexapro in the past but it made him nervous. He had also been on Seroquel in the past and this was also ineffective. Patient is on a waiting list for  outpatient psychiatry. He had been referred to [facility] in the past, delayed by insurance issues, then did not follow through. The patient stated he has no history of violence. He recalls no abuse or trauma in the past. He has no current legal involvement. He stated he had a history of experimental drug use in his teens and 20s. He does not drink alcohol or use illicit drugs. Patient does require an inpatient admission at this time due to inability to function at his usual baseline due to poor reality-testing.      On admission to Inpatient Psychiatric Unit:  Patient was generally calm and cooperative with interview.  He reports that he has been struggling with auditory and visual hallucinations.  Reports that the voices say " derogatory things and have been bothersome to him.  He reports that he has been on Seroquel in the outpatient setting but this has not been helpful for him.  He reports his mood is depressed and he has been having trouble sleeping because of his hallucinations.  Reports that his hallucinations are affecting his daily life and functioning.  He is not on any current medications besides Seroquel which he does not feel has been effective for him.  Amenable to medications for ongoing hallucinations.  He received as needed Zyprexa which she feels was effective and we discussed trialing Zyprexa as a standing medication after discussion of risks and benefits.  He was amenable to starting Zyprexa and monitoring for improvement in hallucinations.    Past Medical History:   Diagnosis Date    ADHD     Adjustment disorder     Allergic     Anxiety     Asthma     Depression     Dysuria     Last assessed - 1/24/14    Hallucination     Psychosis (HCC)     Sleep difficulties     Substance abuse (HCC)     Suicide attempt (HCC)      No past surgical history on file.  No Known Allergies    Objective :  Temp:  [97.1 °F (36.2 °C)-98 °F (36.7 °C)] 97.1 °F (36.2 °C)  HR:  [71-83] 71  BP: (121-128)/(82-85) 121/82  Resp:  [17] 17  SpO2:  [96 %] 96 %  O2 Device: None (Room air)    Hospital Course: No notes on file    Leann was admitted to the inpatient psychiatric unit and started on Behavioral Health checks for safety monitoring. During the hospitalization he was attending individual therapy, group therapy, milieu therapy and occupational therapy..     Psychiatric medications were adjusted over the hospital stay. To address psychotic symptoms, Leann was treated with antipsychotic medication Zyprexa, antiparkinsonian medication Cogentin, and anxiolytic medication Atarax. Medication doses were adjusted during the hospital course.  Prior to beginning of treatment medications risks and benefits and possible side effects including risk of  parkinsonian symptoms, Tardive Dyskinesia and metabolic syndrome related to treatment with antipsychotic medications were reviewed with Leann. He verbalized understanding and agreement for treatment. Upon admission Leann was seen by medical service for medical clearance for inpatient treatment and medical follow up.     Leann's symptoms slowly improved over the hospital course. Initially after admission he was still feeling anxious, frustrated, overwhelmed, and psychotic. With adjustment of medications and therapeutic milieu his symptoms slowly improved. At the end of treatment Leann was doing much better. His mood was doing much better at the time of discharge. Leann denied suicidal ideation, intent or plan at the time of discharge and denied homicidal ideation, intent or plan at the time of discharge. Leann was now remorseful about suicide attempt and had more hope for the future. There was no overt psychosis at the time of discharge. Auditory hallucinations were resolved. Delusional thoughts were no longer present. Paranoid ideation was resolved. Leann was participating appropriately in milieu at the time of discharge. Behavior was appropriate on the unit at the time of discharge. Leann was tolerating medications and was not reporting any significant side effects at the time of discharge.     Since Leann was doing well at the end of the hospitalization, treatment team felt that Leann could be safely discharged to outpatient care.     The outpatient follow up with was arranged by the unit  upon discharge.     Leann was discharged on a following medication regimen:  Zyprexa 5 mg daily and Zyprexa 10 mg nightly for psychosis and mood  Cogentin 1 mg HS for EPS symptoms  Atarax 25 mg BID PRN for anxiety symptoms    Mental Status at Time of Discharge:     Appearance:  age appropriate, casually dressed   Behavior:  cooperative, calm   Speech:  normal rate, normal volume   Mood:   euthymic   Affect:  appropriate   Thought Process:  goal directed, linear   Thought Content:  no overt delusions   Perceptual Disturbances: no auditory hallucinations, no visual hallucinations   Risk Potential: Suicidal Ideation -  None at present  Homicidal Ideation -  None at present  Potential for Aggression - Not at present   Sensorium:  oriented to person, place, and time/date   Memory:  recent and remote memory grossly intact   Consciousness:  alert and awake   Attention/Concentration: attention span and concentration are age appropriate   Insight:  fair   Judgment: fair   Gait/Station: normal gait/station   Motor Activity: no abnormal movements     Suicide/Homicide Risk Assessment:    Risk of Harm to Self:   The following ratings are based on assessment at the time of the interview  Nursing Suicide Risk Assessment Last 24 hours: C-SSRS Risk (Since Last Contact)  Calculated C-SSRS Risk Score (Since Last Contact): No Risk Indicated  Demographic Risk Factors include: never , male  Historical Risk Factors include: history of psychosis, history of suicide attempts  Current Specific Risk Factors include: recent inpatient psychiatric admission - being discharged today  Protective Factors: stable mood, no current psychotic symptoms, ability to adapt to change, ability to manage anger well, ability to make plans for the future, no current suicidal plan or intent, family support established, safe and stable living environment, good self-esteem, impulse control, restricted access to lethal means, supportive family, supportive friends, ability to contract for safety with staff, ability to communicate with staff  Weapons/Firearms: no firearms. The following steps have been taken to ensure weapons are properly secured: not applicable  Based on today's assessment, Leann presents the following risk of harm to self: none    Risk of Harm to Others:  The following ratings are based on  assessment at the time of the  interview  Nursing Homicide Risk Assessment: Violence Risk to Others: Denies within past 6 months  Demographic Risk Factors include: male  Historical Risk Factors include: none  Current Specific Risk Factors include: multiple stressors  Protective Factors: good impulse control, stable mood, improved mood, ability to adapt to change, able to manage anger well, effective coping skills, no prior history of violence, responsibilities and duties to others, personal beliefs, Anabaptism beliefs, resilience, restricted access to lethal means, sense of personal control  Weapons/Firearms: none. The following steps have been taken to ensure weapons are properly secured: not applicable  Based on today's assessment, Laenn presents the following risk of harm to others: none    The following interventions are recommended: outpatient follow up with a psychiatrist, outpatient follow up with a therapist      Lab Results: I have reviewed the following results:  Results from the past 24 hours: No results found for this or any previous visit (from the past 24 hours).    Discharge Medications:    Home Medications After Discharge    Scheduled   benztropine (COGENTIN) 1 mg tablet, Take 1 tablet (1 mg total) by mouth daily   lisinopril (ZESTRIL) 5 mg tablet, Take 2 tablets (10 mg total) by mouth daily   OLANZapine (ZyPREXA) 15 mg tablet, Take 1 tablet (15 mg total) by mouth daily at bedtime   OLANZapine (ZyPREXA) 5 mg tablet, Take 1 tablet (5 mg total) by mouth daily, Start: 04/26/25    PRN   hydrOXYzine HCL (ATARAX) 25 mg tablet, Take 1 tablet (25 mg total) by mouth 2 (two) times a day as needed for itching    Discharge instructions/Information to patient and family:   See After Visit Summary for information provided to patient and family.      Provisions for Follow-Up Care:  See after visit summary for information related to follow-up care and any pertinent home health orders.      Discharge Statement:    I have spent a total time of 45  "minutes in caring for this patient on the day of the visit/encounter.   >30 minutes of time was spent on: Diagnostic results, Prognosis, Risks and benefits of tx options, Instructions for management, Patient and family education, Importance of tx compliance, Risk factor reductions, Counseling / Coordination of care, Documenting in the medical record, Reviewing / ordering tests, medicine, procedures  , and Communicating with other healthcare professionals .  I reviewed with Leann importance of compliance with medications and outpatient treatment after discharge.  I discussed the medication regimen and possible side effects of the medications with Leann prior to discharge. At the time of discharge he was tolerating psychiatric medications.  I discussed outpatient follow up with Noeldenise.  I reviewed with Leann crisis plan and safety plan upon discharge.  Leann agreed to abstain from drug and alcohol use after discharge.  Leann was competent to understand risks and benefits of withholding information and risks and benefits of his actions.    Discharge on Two Antipsychotic Medications: No    Portions of the record may have been created with voice recognition software. Occasional wrong word or \"sound a like\" substitutions may have occurred due to the inherent limitations of voice recognition software. Read the chart carefully and recognize, using context, where substitutions have occurred.    Nelly Baca PA-C 04/25/25   "

## 2025-04-25 NOTE — NURSING NOTE
@ 7312 pt requested and received cogentin 1 mg PO for EPS. Upon follow up PRN was effective per pt.

## 2025-04-25 NOTE — NURSING NOTE
"Pt is awake, currently taking linens off their bed to prepare for planned discharge. Pt did request a script for Cogentin. This information was passed to the covering provider to further discuss with pt prior to discharge. Pt denies SI, HI, VH at this time. States AH is \"okay\", subtle pause when asked about paranoia, but pt does deny at this time. Discussed medications and importance of continuing meds after discharge.   "

## 2025-05-02 ENCOUNTER — TELEPHONE (OUTPATIENT)
Dept: GASTROENTEROLOGY | Facility: CLINIC | Age: 30
End: 2025-05-02

## 2025-05-02 ENCOUNTER — CONSULT (OUTPATIENT)
Dept: GASTROENTEROLOGY | Facility: CLINIC | Age: 30
End: 2025-05-02

## 2025-05-02 VITALS
TEMPERATURE: 98.5 F | HEIGHT: 72 IN | BODY MASS INDEX: 25.27 KG/M2 | SYSTOLIC BLOOD PRESSURE: 124 MMHG | WEIGHT: 186.6 LBS | DIASTOLIC BLOOD PRESSURE: 88 MMHG

## 2025-05-02 DIAGNOSIS — R10.9 RIGHT SIDED ABDOMINAL PAIN: Primary | ICD-10-CM

## 2025-05-02 DIAGNOSIS — R11.2 NAUSEA AND VOMITING IN ADULT: ICD-10-CM

## 2025-05-02 DIAGNOSIS — R13.19 ESOPHAGEAL DYSPHAGIA: ICD-10-CM

## 2025-05-02 RX ORDER — SODIUM CHLORIDE, SODIUM LACTATE, POTASSIUM CHLORIDE, CALCIUM CHLORIDE 600; 310; 30; 20 MG/100ML; MG/100ML; MG/100ML; MG/100ML
125 INJECTION, SOLUTION INTRAVENOUS CONTINUOUS
OUTPATIENT
Start: 2025-05-02

## 2025-05-02 RX ORDER — TRAZODONE HYDROCHLORIDE 50 MG/1
TABLET ORAL
COMMUNITY
Start: 2025-04-25

## 2025-05-02 RX ORDER — DICYCLOMINE HYDROCHLORIDE 10 MG/1
10 CAPSULE ORAL 3 TIMES DAILY PRN
Qty: 90 CAPSULE | Refills: 0 | Status: SHIPPED | OUTPATIENT
Start: 2025-05-02

## 2025-05-02 NOTE — PROGRESS NOTES
Name: Leann Mcnair      : 1995      MRN: [id number]  Encounter Provider: Catalina Flores PA-C  Encounter Date: 2025   Encounter department: [facility] [place]  :  Assessment & Plan  Right sided abdominal pain  Patient with right sided abdominal pain with associated nausea and vomiting for the last 6 weeks.  He also endorses solid food dysphagia.  No prior EGD.  He has a bowel movement every 3 days but states that he has always been this way and the pain is not at all related to bowel movements.  We reviewed his prior workup including labs and CT imaging in the ER which were grossly unremarkable.  I personally reviewed the CT scan images and did not appreciate a significant stool burden.    We discussed potential differential diagnosis for symptoms  I recommended patient undergo right upper quadrant ultrasound to rule out cholelithiasis  I also recommend that patient undergo EGD with biopsy to rule out EOE, H. pylori, celiac disease  He had relief with dicyclomine previously prescribed from ER, will reorder at this time  We otherwise discussed the importance of a well-rounded, healthy diet and staying hydrated.  We also discussed and I recommended he eat small, frequent meals and try to not eat close to bedtime and avoid NSAIDs.  ER precautions reviewed.    Patient expressed understanding and in agreement with plan.  All questions answered.  Pending results of EGD pending would likely benefit from trial of PPI if symptoms persist    Orders:    Ambulatory Referral to Gastroenterology    US right upper quadrant; Future    EGD; Future    dicyclomine (BENTYL) 10 mg capsule; Take 1 capsule (10 mg total) by mouth 3 (three) times a day as needed (abdominal pain / discomfort)    Nausea and vomiting in adult  As above.   Orders:    Ambulatory Referral to Gastroenterology    US right upper quadrant; Future    EGD; Future    Esophageal dysphagia  As above. Instructed the  patient to take small bites with careful chewing. Recommended patient eat sitting upright and take their time while eating.     Orders:    EGD; Future    The risk/benefits/alternatives of the procedure/s were discussed with the patient.  Risks included, but not limited to, infection, bleeding, perforation, injury to organs in the abdomen, missed lesion and incomplete procedure were discussed.  Patient expressed understanding and agreeable to proceed with procedure/s.      Patient was instructed to call the office with any questions, concerns, new/ worsening/ persisting GI symptoms. Advised patient go to the ER with any severe or worsening abdominal pain, fevers/ chills, intractable N/V, chest pain, SOB, dizziness, lightheadedness, feeling something stuck in esophagus that will not go down. Patient expressed understanding and is in agreement with treatment plan.     Will plan to follow up after diagnostic tests in a few months       History of Present Illness     Leann Mcnair is a 30 y.o. male with a past medical history of hypertension, ADD, adjustment disorder, anxiety and depression, psychotic disorder who presents to the office today as a referral to discuss abdominal pain and nausea and vomiting.    Patient complains of abdominal pain x 6 weeks.   The pain is located on the entire right side of his abdomen.   The pain has been constant. Can radiate to periumbilical area at times.   He describes the pain as sharp and stabbing. He rates the pain 7/10 in severity at times. The pain can be worse when he lays on that side. Eating/ meals does not affect pain. Pain is also not related to bowel movements. No certain food triggers.   He has a Bm once every 3 days which is his baseline. No constipation or hard stools. He denies diarrhea. No blood in stool or black stools.   There is associated nausea and vomiting with the pain.   The nausea comes and comes and is worse in the AM. The vomiting is occurring ~ once/ week  and this is after eating.   Symptoms became severe leading patient to ER 3/21/2025. ER note reviewed.  Workup in ER including CT of the abdomen and pelvis with IV contrast was unremarkable and it was recommended patient follow-up with GI.  Patient states he was on both Dicyclomine and Zofran after ER visit which helped but he no longer takes these medications  Patient also endorses occasional long- standing difficulty swallowing solid foods.  Mostly with tougher things like bread or meat.  He feels them get stuck in his esophagus/chest after swallow.  The food eventually goes down with water or on its own.  No issues swallowing liquids. No pain with swallowing.   He reports recent weight gain.  Patient denies any fevers/ chills, heartburn, acid reflux, odynophagia.   Denies chest pain, SOB    Tobacco use- Cigar here and there   Alcohol use- Occasional   NSAID use- None    No prior EGD or colonoscopy   No abdominal surgeries     Patient denies first-degree relative with colon cancer, inflammatory bowel disease, celiac disease     Review of Systems   Constitutional:  Negative for chills and fever.   HENT:  Positive for trouble swallowing. Negative for ear pain and sore throat.    Eyes:  Negative for pain and visual disturbance.   Respiratory:  Negative for cough and shortness of breath.    Cardiovascular:  Negative for chest pain and palpitations.   Gastrointestinal:  Positive for abdominal pain, nausea and vomiting.   Genitourinary:  Negative for dysuria and hematuria.   Musculoskeletal:  Negative for arthralgias and back pain.   Skin:  Negative for color change and rash.   Neurological:  Negative for seizures and syncope.   All other systems reviewed and are negative.      Objective   /88 (BP Location: Right arm, Patient Position: Sitting, Cuff Size: Standard)   Temp 98.5 °F (36.9 °C) (Tympanic)   Ht 6' (1.829 m)   Wt 84.6 kg (186 lb 9.6 oz)   BMI 25.31 kg/m²      Physical Exam  Vitals reviewed.    Constitutional:       General: He is not in acute distress.     Appearance: He is well-developed.   HENT:      Head: Normocephalic and atraumatic.   Eyes:      Conjunctiva/sclera: Conjunctivae normal.   Cardiovascular:      Rate and Rhythm: Normal rate and regular rhythm.      Heart sounds: No murmur heard.  Pulmonary:      Effort: Pulmonary effort is normal. No respiratory distress.      Breath sounds: Normal breath sounds.   Abdominal:      General: Bowel sounds are normal.      Palpations: Abdomen is soft.      Tenderness: There is abdominal tenderness (minimal right sided).   Musculoskeletal:         General: No swelling.      Cervical back: Neck supple.   Skin:     General: Skin is warm and dry.      Capillary Refill: Capillary refill takes less than 2 seconds.   Neurological:      General: No focal deficit present.      Mental Status: He is alert and oriented to person, place, and time.   Psychiatric:         Mood and Affect: Mood normal.         Behavior: Behavior normal.       Lab Results   Component Value Date    WBC 5.86 04/19/2025    HGB 15.0 04/19/2025    HCT 45.3 04/19/2025    MCV 86 04/19/2025     04/19/2025     Lab Results   Component Value Date    SODIUM 139 04/19/2025    K 4.0 04/19/2025     04/19/2025    CO2 29 04/19/2025    AGAP 6 04/19/2025    BUN 9 04/19/2025    CREATININE 1.06 04/19/2025    GLUC 92 04/19/2025    GLUF 92 04/19/2025    CALCIUM 9.5 04/19/2025    AST 16 04/19/2025    ALT 15 04/19/2025    ALKPHOS 42 04/19/2025    TP 7.2 04/19/2025    TBILI 0.61 04/19/2025    EGFR 93 04/19/2025     Lab Results   Component Value Date    TBT0OTZYBTGQ 0.607 04/19/2025    TSH 1.43 05/18/2020

## 2025-05-02 NOTE — PATIENT INSTRUCTIONS
Small frequent meals   Try to not eat close to bedtime   Avoid Asprin, Alleve, Ibuprofen   Use Tylenol for aches and pains OTC as directed if needed       Scheduled date of EGD(as of today): 05/29/2025  Physician performing EGD: Dr. Foley   Location of EGD: [facility]  Instructions reviewed with patient by: Ritu ALLAN   Clearances:  N/A      schedule for prior to EGD @ [facility]

## 2025-05-15 ENCOUNTER — TELEPHONE (OUTPATIENT)
Dept: GASTROENTEROLOGY | Facility: AMBULARY SURGERY CENTER | Age: 30
End: 2025-05-15

## 2025-05-15 ENCOUNTER — ANESTHESIA EVENT (OUTPATIENT)
Dept: ANESTHESIOLOGY | Facility: HOSPITAL | Age: 30
End: 2025-05-15

## 2025-05-15 ENCOUNTER — TELEPHONE (OUTPATIENT)
Dept: GASTROENTEROLOGY | Facility: CLINIC | Age: 30
End: 2025-05-15

## 2025-05-15 ENCOUNTER — ANESTHESIA (OUTPATIENT)
Dept: ANESTHESIOLOGY | Facility: HOSPITAL | Age: 30
End: 2025-05-15

## 2025-05-15 NOTE — TELEPHONE ENCOUNTER
As per Dr. Foley, to schedule the procedure with him @ [facility].   Rescheduled patient at the [facility] (hospital setting) on 6/13/25 with the provider. M for pt's mother, Gemini, to call and confirm EGD procedure rescheduled to 6/13/25.     Scheduled date of EGD (as of today): 6/13/2025  Physician performing EGD: Dr. RL Foley  Location of EGD: [place]   Desired bowel prep reviewed with patient: EGD proc prep instructions sent thru [facility]  Instructions reviewed with patient by: US  Clearances:  N/A

## 2025-05-15 NOTE — TELEPHONE ENCOUNTER
---- Message -----   From: Myrtle Beatty DO   Sent: 5/15/2025  11:42 AM EDT   To: Larry Foley MD; [facility] Reschedule Pool   Subject: Pt reschedule                                    Hi,   Please reschedule this pt for the hospital.  He was just recently admitted to in psych for auditory and visual hallucinations.  We don't have the support staff at [facility] in the event of a psychotic episode.   Thanks     Called and left a VM to call the office back to confirm new procedure location. I rescheduled it to [place] on 5/29/25 with Dr Hernandez

## 2025-06-13 ENCOUNTER — ANESTHESIA EVENT (OUTPATIENT)
Dept: GASTROENTEROLOGY | Facility: HOSPITAL | Age: 30
End: 2025-06-13
Payer: MEDICARE

## 2025-06-13 ENCOUNTER — PREP FOR PROCEDURE (OUTPATIENT)
Dept: GASTROENTEROLOGY | Facility: CLINIC | Age: 30
End: 2025-06-13

## 2025-06-13 ENCOUNTER — ANESTHESIA (OUTPATIENT)
Dept: GASTROENTEROLOGY | Facility: HOSPITAL | Age: 30
End: 2025-06-13
Payer: MEDICARE

## 2025-06-13 ENCOUNTER — TELEPHONE (OUTPATIENT)
Dept: GASTROENTEROLOGY | Facility: CLINIC | Age: 30
End: 2025-06-13

## 2025-06-13 ENCOUNTER — HOSPITAL ENCOUNTER (OUTPATIENT)
Dept: GASTROENTEROLOGY | Facility: HOSPITAL | Age: 30
Setting detail: OUTPATIENT SURGERY
End: 2025-06-13
Attending: PHYSICIAN ASSISTANT
Payer: MEDICARE

## 2025-06-13 VITALS
TEMPERATURE: 98 F | OXYGEN SATURATION: 99 % | RESPIRATION RATE: 15 BRPM | DIASTOLIC BLOOD PRESSURE: 68 MMHG | SYSTOLIC BLOOD PRESSURE: 122 MMHG | BODY MASS INDEX: 25.19 KG/M2 | HEART RATE: 68 BPM | HEIGHT: 72 IN | WEIGHT: 186 LBS

## 2025-06-13 DIAGNOSIS — R13.19 ESOPHAGEAL DYSPHAGIA: ICD-10-CM

## 2025-06-13 DIAGNOSIS — R11.2 NAUSEA AND VOMITING IN ADULT: ICD-10-CM

## 2025-06-13 DIAGNOSIS — R10.9 RIGHT SIDED ABDOMINAL PAIN: ICD-10-CM

## 2025-06-13 DIAGNOSIS — R13.19 ESOPHAGEAL DYSPHAGIA: Primary | ICD-10-CM

## 2025-06-13 PROCEDURE — 43235 EGD DIAGNOSTIC BRUSH WASH: CPT | Performed by: INTERNAL MEDICINE

## 2025-06-13 RX ORDER — QUETIAPINE FUMARATE 50 MG/1
50 TABLET, FILM COATED ORAL 2 TIMES DAILY
COMMUNITY
Start: 2025-06-10

## 2025-06-13 RX ORDER — SODIUM CHLORIDE, SODIUM LACTATE, POTASSIUM CHLORIDE, CALCIUM CHLORIDE 600; 310; 30; 20 MG/100ML; MG/100ML; MG/100ML; MG/100ML
125 INJECTION, SOLUTION INTRAVENOUS CONTINUOUS
Status: DISCONTINUED | OUTPATIENT
Start: 2025-06-13 | End: 2025-06-17 | Stop reason: HOSPADM

## 2025-06-13 RX ORDER — SODIUM CHLORIDE, SODIUM LACTATE, POTASSIUM CHLORIDE, CALCIUM CHLORIDE 600; 310; 30; 20 MG/100ML; MG/100ML; MG/100ML; MG/100ML
125 INJECTION, SOLUTION INTRAVENOUS CONTINUOUS
Status: CANCELLED | OUTPATIENT
Start: 2025-06-13

## 2025-06-13 RX ORDER — PROPOFOL 10 MG/ML
INJECTION, EMULSION INTRAVENOUS AS NEEDED
Status: DISCONTINUED | OUTPATIENT
Start: 2025-06-13 | End: 2025-06-13

## 2025-06-13 RX ORDER — LIDOCAINE HCL/PF 100 MG/5ML
SYRINGE (ML) INJECTION AS NEEDED
Status: DISCONTINUED | OUTPATIENT
Start: 2025-06-13 | End: 2025-06-13

## 2025-06-13 RX ORDER — SODIUM CHLORIDE, SODIUM LACTATE, POTASSIUM CHLORIDE, CALCIUM CHLORIDE 600; 310; 30; 20 MG/100ML; MG/100ML; MG/100ML; MG/100ML
INJECTION, SOLUTION INTRAVENOUS CONTINUOUS PRN
Status: DISCONTINUED | OUTPATIENT
Start: 2025-06-13 | End: 2025-06-13

## 2025-06-13 RX ADMIN — PROPOFOL 150 MG: 10 INJECTION, EMULSION INTRAVENOUS at 15:02

## 2025-06-13 RX ADMIN — SODIUM CHLORIDE, SODIUM LACTATE, POTASSIUM CHLORIDE, AND CALCIUM CHLORIDE 125 ML/HR: .6; .31; .03; .02 INJECTION, SOLUTION INTRAVENOUS at 13:42

## 2025-06-13 RX ADMIN — LIDOCAINE HYDROCHLORIDE 100 MG: 20 INJECTION INTRAVENOUS at 15:02

## 2025-06-13 RX ADMIN — PROPOFOL 150 MG: 10 INJECTION, EMULSION INTRAVENOUS at 15:03

## 2025-06-13 RX ADMIN — SODIUM CHLORIDE, SODIUM LACTATE, POTASSIUM CHLORIDE, AND CALCIUM CHLORIDE: .6; .31; .03; .02 INJECTION, SOLUTION INTRAVENOUS at 13:41

## 2025-06-13 NOTE — TELEPHONE ENCOUNTER
I called & lvm for the patient's mom to conf new procedure date of 6/16/25 with Dr. Foley at the [facility].

## 2025-06-13 NOTE — H&P
History and Physical -  [facility]  Leann Mcnair 30 y.o. male MRN: [id number]                  HPI: Leann Mcnair is a 30 y.o. year old male who presents for EGD for abdominal pain, nausea and vomiting and dysphagia.      REVIEW OF SYSTEMS: Per the HPI, and otherwise unremarkable.    Historical Information   Past Medical History[1]  Past Surgical History[2]  Social History   Social History     Substance and Sexual Activity   Alcohol Use Yes    Alcohol/week: 2.0 standard drinks of alcohol    Types: 2 Cans of beer per week    Comment: socially     Social History     Substance and Sexual Activity   Drug Use Not Currently    Types: Marijuana, Amphetamines, Methamphetamines    Comment: UDS negative 4/18/2025     Tobacco Use History[3]  Family History[4]    Meds/Allergies     Not in a hospital admission.    Allergies[5]    Objective     Blood pressure 142/93, pulse 91, temperature (!) 97.2 °F (36.2 °C), temperature source Temporal, resp. rate 14, height 6' (1.829 m), weight 84.4 kg (186 lb), SpO2 99%.      PHYSICAL EXAM    Gen: NAD  CV: RRR  CHEST: Clear  ABD: soft, NT/ND  EXT: no edema      ASSESSMENT/PLAN:  Leann Mcnair is a 30 y.o. year old male who presents for EGD for abdominal pain, nausea and vomiting and dysphagia. The patient is stable and optimized for the procedure, we reviewed risk and benefits. Risk include but not limited to infection, bleeding, perforation and missing a lesion.               [1]   Past Medical History:  Diagnosis Date    ADHD     Adjustment disorder     Allergic     Anxiety     Asthma     Depression     Dysuria     Last assessed - 1/24/14    Hallucination     Psychosis (HCC)     Sleep difficulties     Substance abuse (HCC)     Suicide attempt (HCC)    [2] No past surgical history on file.  [3]   Social History  Tobacco Use   Smoking Status Some Days    Current packs/day: 0.25    Average packs/day: 0.3 packs/day for 9.0 years (2.2 ttl pk-yrs)    Types: Cigarettes,  Cigars    Start date: 6/27/2016   Smokeless Tobacco Never   Tobacco Comments    2-3 cigars/week   [4]   Family History  Problem Relation Name Age of Onset    RACHID disease Mother          Reflux    Other Mother          Rhinitis    No Known Problems Father      Allergies Cousin          Allergic Disorder    Asthma Cousin     [5]   Allergies  Allergen Reactions    Pollen Extract Itching and Sneezing

## 2025-06-13 NOTE — ANESTHESIA PREPROCEDURE EVALUATION
Procedure:  EGD    Relevant Problems   CARDIO   (+) HTN (hypertension)      NEURO/PSYCH   (+) Anxiety   (+) Depression   (+) Social anxiety disorder        Physical Exam    Airway     Mallampati score: II  TM Distance: >3 FB  Neck ROM: full  Upper bite lip test: I  Mouth opening: >= 4 cm      Cardiovascular  Rhythm: regular, Rate: normal, Pulse is strong. Cardiovascular exam normal    Dental   No notable dental hx     Pulmonary  Pulmonary exam normal Breath sounds clear to auscultation    Neurological  - normal exam  He appears awake, alert and oriented x3.      Other Findings  post-pubertal.      Anesthesia Plan  ASA Score- 2     Anesthesia Type- IV sedation with anesthesia with ASA Monitors.         Additional Monitors:     Airway Plan: natural airway.           Plan Factors-Exercise tolerance (METS): >4 METS.    Chart reviewed. EKG reviewed. Imaging results reviewed. Existing labs reviewed. Patient summary reviewed.    Patient is not a current smoker.  Patient instructed to abstain from smoking on day of procedure. Patient did not smoke on day of surgery.    Obstructive sleep apnea risk education given perioperatively.        Induction- intravenous.    Postoperative Plan- .   Monitoring Plan - Monitoring plan - standard ASA monitoring      Perioperative Resuscitation Plan - Level 1 - Full Code.       Informed Consent- Anesthetic plan and risks discussed with patient and mother.  I personally reviewed this patient with the CRNA. Discussed and agreed on the Anesthesia Plan with the CRNA..      NPO Status:  Vitals Value Taken Time   Date of last liquid 06/13/25 06/13/25 13:27   Time of last liquid 0800 06/13/25 13:27   Date of last solid 06/12/25 06/13/25 13:27   Time of last solid 2000 06/13/25 13:27

## 2025-06-13 NOTE — ANESTHESIA POSTPROCEDURE EVALUATION
Post-Op Assessment Note    CV Status:  Stable  Pain Score: 0    Pain management: adequate       Mental Status:  Alert   Hydration Status:  Stable   PONV Controlled:  None   Airway Patency:  Patent and adequate     Post Op Vitals Reviewed: Yes    No anethesia notable event occurred.    Staff: CRNA           Last Filed PACU Vitals:  Vitals Value Taken Time   Temp 98 °F (36.7 °C) 06/13/25 15:14   Pulse 100 06/13/25 15:14   /82 06/13/25 15:14   Resp 17 06/13/25 15:14   SpO2 99 % 06/13/25 15:14       Modified Jen:     Vitals Value Taken Time   Activity 2 06/13/25 15:14   Respiration 2 06/13/25 15:14   Circulation 2 06/13/25 15:14   Consciousness 2 06/13/25 15:14   Oxygen Saturation 2 06/13/25 15:14     Modified Jen Score: 10

## 2025-06-13 NOTE — PROGRESS NOTES
Pt and mother aware to stay on clears Sunday, and NPO on Monday for repeat endoscopy. Pt to be re-scheduled for egd on Monday at [facility]. Pt and mother in agreement

## 2025-06-16 ENCOUNTER — HOSPITAL ENCOUNTER (OUTPATIENT)
Dept: GASTROENTEROLOGY | Facility: HOSPITAL | Age: 30
Setting detail: OUTPATIENT SURGERY
Discharge: HOME | End: 2025-06-16
Attending: INTERNAL MEDICINE
Payer: MEDICARE

## 2025-06-16 ENCOUNTER — ANESTHESIA EVENT (OUTPATIENT)
Dept: GASTROENTEROLOGY | Facility: HOSPITAL | Age: 30
End: 2025-06-16
Payer: MEDICARE

## 2025-06-16 ENCOUNTER — ANESTHESIA (OUTPATIENT)
Dept: GASTROENTEROLOGY | Facility: HOSPITAL | Age: 30
End: 2025-06-16
Payer: MEDICARE

## 2025-06-16 ENCOUNTER — TELEPHONE (OUTPATIENT)
Age: 30
End: 2025-06-16

## 2025-06-16 VITALS
HEART RATE: 72 BPM | SYSTOLIC BLOOD PRESSURE: 133 MMHG | DIASTOLIC BLOOD PRESSURE: 82 MMHG | WEIGHT: 186 LBS | HEIGHT: 72 IN | BODY MASS INDEX: 25.19 KG/M2 | OXYGEN SATURATION: 100 % | RESPIRATION RATE: 18 BRPM | TEMPERATURE: 97.5 F

## 2025-06-16 DIAGNOSIS — R11.2 NAUSEA AND VOMITING IN ADULT: ICD-10-CM

## 2025-06-16 DIAGNOSIS — R10.9 RIGHT SIDED ABDOMINAL PAIN: ICD-10-CM

## 2025-06-16 DIAGNOSIS — R13.19 ESOPHAGEAL DYSPHAGIA: ICD-10-CM

## 2025-06-16 DIAGNOSIS — K20.90 ESOPHAGITIS: Primary | ICD-10-CM

## 2025-06-16 PROCEDURE — 88305 TISSUE EXAM BY PATHOLOGIST: CPT | Performed by: PATHOLOGY

## 2025-06-16 PROCEDURE — C1726 CATH, BAL DIL, NON-VASCULAR: HCPCS

## 2025-06-16 PROCEDURE — 43239 EGD BIOPSY SINGLE/MULTIPLE: CPT | Performed by: INTERNAL MEDICINE

## 2025-06-16 PROCEDURE — 43249 ESOPH EGD DILATION <30 MM: CPT | Performed by: INTERNAL MEDICINE

## 2025-06-16 RX ORDER — OMEPRAZOLE 40 MG/1
40 CAPSULE, DELAYED RELEASE ORAL
Qty: 60 CAPSULE | Refills: 2 | Status: SHIPPED | OUTPATIENT
Start: 2025-06-16 | End: 2025-09-14

## 2025-06-16 RX ORDER — PROPOFOL 10 MG/ML
INJECTION, EMULSION INTRAVENOUS AS NEEDED
Status: DISCONTINUED | OUTPATIENT
Start: 2025-06-16 | End: 2025-06-16

## 2025-06-16 RX ORDER — PROPOFOL 10 MG/ML
INJECTION, EMULSION INTRAVENOUS CONTINUOUS PRN
Status: DISCONTINUED | OUTPATIENT
Start: 2025-06-16 | End: 2025-06-16

## 2025-06-16 RX ORDER — SODIUM CHLORIDE, SODIUM LACTATE, POTASSIUM CHLORIDE, CALCIUM CHLORIDE 600; 310; 30; 20 MG/100ML; MG/100ML; MG/100ML; MG/100ML
125 INJECTION, SOLUTION INTRAVENOUS CONTINUOUS
Status: DISCONTINUED | OUTPATIENT
Start: 2025-06-16 | End: 2025-06-20 | Stop reason: HOSPADM

## 2025-06-16 RX ADMIN — PROPOFOL 150 MG: 10 INJECTION, EMULSION INTRAVENOUS at 15:11

## 2025-06-16 RX ADMIN — SODIUM CHLORIDE, SODIUM LACTATE, POTASSIUM CHLORIDE, AND CALCIUM CHLORIDE: .6; .31; .03; .02 INJECTION, SOLUTION INTRAVENOUS at 15:10

## 2025-06-16 RX ADMIN — SODIUM CHLORIDE, SODIUM LACTATE, POTASSIUM CHLORIDE, AND CALCIUM CHLORIDE 125 ML/HR: .6; .31; .03; .02 INJECTION, SOLUTION INTRAVENOUS at 14:28

## 2025-06-16 RX ADMIN — PROPOFOL 180 MCG/KG/MIN: 10 INJECTION, EMULSION INTRAVENOUS at 15:13

## 2025-06-16 RX ADMIN — PROPOFOL 100 MG: 10 INJECTION, EMULSION INTRAVENOUS at 15:23

## 2025-06-16 RX ADMIN — PROPOFOL 50 MG: 10 INJECTION, EMULSION INTRAVENOUS at 15:13

## 2025-06-16 NOTE — TELEPHONE ENCOUNTER
Pt calling re: his EGD today. No one has called him w/ his arrival time. I don't see any notes on arrival time, there is a confirmation note w/ pt's mother on 6/13/25. Pt has not eaten or drank anything today. I called the [facility] campus and got a voice mail. I left message letting them know that pt needs arrival time. I called again and received their voice mail again. I informed pt that I do see him on the schedule for 3:00 pm today and he should get to the [facility] at [address] around 2:00 pm to 2:15 pm. Per pt if he does not get a call back he will just show up at hospital.

## 2025-06-16 NOTE — ANESTHESIA PREPROCEDURE EVALUATION
Procedure:  EGD    Relevant Problems   ANESTHESIA (within normal limits)      CARDIO   (+) HTN (hypertension)      NEURO/PSYCH   (+) Anxiety   (+) Depression   (+) Social anxiety disorder      Behavioral Health   (+) ADD (attention deficit disorder) without hyperactivity   (+) Amphetamine use disorder, mild, in early remission (HCC)   (+) Auditory hallucinations   (+) Cannabis abuse   (+) Psychotic disorder (HCC)   (+) Tobacco use        Physical Exam    Airway     Mallampati score: II  TM Distance: >3 FB  Neck ROM: full  Mouth opening: >= 4 cm      Cardiovascular  Rhythm: regular, Rate: normalCardiovascular exam normal    Dental   No notable dental hx     Pulmonary  Pulmonary exam normal Breath sounds clear to auscultation    Neurological  - normal exam  He appears awake, alert, oriented x3 and anxious.      Other Findings        Anesthesia Plan  ASA Score- 3     Anesthesia Type- general with ASA Monitors.         Additional Monitors:     Airway Plan: natural airway.           Plan Factors-Exercise tolerance (METS): >4 METS.    Chart reviewed. EKG reviewed.  Existing labs reviewed. Patient summary reviewed.    Patient is a current smoker.  Patient instructed to abstain from smoking on day of procedure. Patient did not smoke on day of surgery.            Induction- intravenous.    Postoperative Plan- .   Monitoring Plan - Monitoring plan - standard ASA monitoring          Informed Consent- Anesthetic plan and risks discussed with patient.        NPO Status:  Vitals Value Taken Time   Date of last liquid 06/16/25 06/16/25 14:15   Time of last liquid 1030 06/16/25 14:15   Date of last solid 06/15/25 06/16/25 14:15   Time of last solid 1900 06/16/25 14:15

## 2025-06-16 NOTE — ANESTHESIA POSTPROCEDURE EVALUATION
Post-Op Assessment Note    CV Status:  Stable    Pain management: adequate       Mental Status:  Alert and awake   Hydration Status:  Euvolemic   PONV Controlled:  Controlled   Airway Patency:  Patent     Post Op Vitals Reviewed: Yes    No anethesia notable event occurred.    Staff: Anesthesiologist           Last Filed PACU Vitals:  Vitals Value Taken Time   Temp 97.5 °F (36.4 °C) 06/16/25 15:29   Pulse 72 06/16/25 15:44   /82 06/16/25 15:44   Resp 18 06/16/25 15:44   SpO2 100 % 06/16/25 15:44       Modified Jen:     Vitals Value Taken Time   Activity 2 06/16/25 15:44   Respiration 2 06/16/25 15:44   Circulation 2 06/16/25 15:44   Consciousness 1 06/16/25 15:44   Oxygen Saturation 2 06/16/25 15:44     Modified Jen Score: 9

## 2025-06-16 NOTE — H&P
History and Physical -  [facility]  Leann Mcnair 30 y.o. male MRN: [id number]                  HPI: Leann Mcnair is a 30 y.o. year old male who presents for nausea, vomiting, dysphagia.       REVIEW OF SYSTEMS: Per the HPI, and otherwise unremarkable.    Historical Information   Past Medical History[1]  Past Surgical History[2]  Social History   Social History     Substance and Sexual Activity   Alcohol Use Yes    Alcohol/week: 2.0 standard drinks of alcohol    Types: 2 Cans of beer per week    Comment: socially     Social History     Substance and Sexual Activity   Drug Use Not Currently    Types: Marijuana, Amphetamines, Methamphetamines    Comment: UDS negative 4/18/2025     Tobacco Use History[3]  Family History[4]    Meds/Allergies     Current Medications[5]    Allergies[6]    Objective     /86   Pulse 80   Temp 97.9 °F (36.6 °C) (Temporal)   Resp 18   Ht 6' (1.829 m)   Wt 84.4 kg (186 lb)   SpO2 99%   BMI 25.23 kg/m²       PHYSICAL EXAM    Gen: NAD  Head: NCAT  CV: RRR  CHEST: Clear  ABD: soft, NT/ND  EXT: no edema      ASSESSMENT/PLAN:  This is a 30 y.o. year old male here for EGD, and he is stable and optimized for his procedure.             [1]   Past Medical History:  Diagnosis Date    ADHD     Adjustment disorder     Allergic     Anxiety     Asthma     Depression     Dysuria     Last assessed - 1/24/14    Hallucination     Psychosis (HCC)     Sleep difficulties     Substance abuse (HCC)     Suicide attempt (HCC)    [2] No past surgical history on file.  [3]   Social History  Tobacco Use   Smoking Status Some Days    Current packs/day: 0.25    Average packs/day: 0.3 packs/day for 9.0 years (2.2 ttl pk-yrs)    Types: Cigarettes, Cigars    Start date: 6/27/2016   Smokeless Tobacco Never   Tobacco Comments    2-3 cigars/week   [4]   Family History  Problem Relation Name Age of Onset    RACHID disease Mother          Reflux    Other Mother          Rhinitis    No Known Problems  Father      Allergies Cousin          Allergic Disorder    Asthma Cousin     [5]   Current Outpatient Medications:     dicyclomine (BENTYL) 10 mg capsule    lisinopril (ZESTRIL) 20 mg tablet    traZODone (DESYREL) 50 mg tablet    benztropine (COGENTIN) 1 mg tablet    hydrOXYzine HCL (ATARAX) 25 mg tablet    lisinopril (ZESTRIL) 5 mg tablet    OLANZapine (ZyPREXA) 15 mg tablet    OLANZapine (ZyPREXA) 5 mg tablet    QUEtiapine (SEROquel) 50 mg tablet    Current Facility-Administered Medications:     lactated ringers infusion, 125 mL/hr, Intravenous, Continuous, 125 mL/hr at 06/16/25 1428    Facility-Administered Medications Ordered in Other Encounters:     lactated ringers infusion, 125 mL/hr, Intravenous, Continuous, 125 mL/hr at 06/13/25 1342  [6]   Allergies  Allergen Reactions    Pollen Extract Itching and Sneezing

## 2025-06-18 PROCEDURE — 88305 TISSUE EXAM BY PATHOLOGIST: CPT | Performed by: PATHOLOGY

## 2025-06-20 ENCOUNTER — HOSPITAL ENCOUNTER (EMERGENCY)
Facility: HOSPITAL | Age: 30
Discharge: HOME | End: 2025-06-20
Attending: EMERGENCY MEDICINE
Payer: MEDICARE

## 2025-06-20 ENCOUNTER — TELEPHONE (OUTPATIENT)
Dept: PSYCHOLOGY | Facility: CLINIC | Age: 30
End: 2025-06-20

## 2025-06-20 VITALS
TEMPERATURE: 98.3 F | OXYGEN SATURATION: 97 % | BODY MASS INDEX: 23.56 KG/M2 | SYSTOLIC BLOOD PRESSURE: 163 MMHG | RESPIRATION RATE: 16 BRPM | WEIGHT: 173.72 LBS | HEART RATE: 95 BPM | DIASTOLIC BLOOD PRESSURE: 99 MMHG

## 2025-06-20 DIAGNOSIS — F41.9 ANXIETY: ICD-10-CM

## 2025-06-20 DIAGNOSIS — R44.0 AUDITORY HALLUCINATIONS: Primary | ICD-10-CM

## 2025-06-20 DIAGNOSIS — F32.3 DEPRESSION, PSYCHOTIC (HCC): ICD-10-CM

## 2025-06-20 LAB
AMPHETAMINES SERPL QL SCN: NEGATIVE
BARBITURATES UR QL: NEGATIVE
BENZODIAZ UR QL: NEGATIVE
COCAINE UR QL: NEGATIVE
ETHANOL EXG-MCNC: 0 MG/DL
FENTANYL UR QL SCN: NEGATIVE
FLUAV AG UPPER RESP QL IA.RAPID: NEGATIVE
FLUBV AG UPPER RESP QL IA.RAPID: NEGATIVE
HYDROCODONE UR QL SCN: NEGATIVE
METHADONE UR QL: NEGATIVE
OPIATES UR QL SCN: NEGATIVE
OXYCODONE+OXYMORPHONE UR QL SCN: NEGATIVE
PCP UR QL: NEGATIVE
SARS-COV+SARS-COV-2 AG RESP QL IA.RAPID: NEGATIVE
THC UR QL: NEGATIVE

## 2025-06-20 PROCEDURE — 80307 DRUG TEST PRSMV CHEM ANLYZR: CPT | Performed by: PHYSICIAN ASSISTANT

## 2025-06-20 PROCEDURE — 87811 SARS-COV-2 COVID19 W/OPTIC: CPT | Performed by: PHYSICIAN ASSISTANT

## 2025-06-20 PROCEDURE — 99284 EMERGENCY DEPT VISIT MOD MDM: CPT | Performed by: PHYSICIAN ASSISTANT

## 2025-06-20 PROCEDURE — 99284 EMERGENCY DEPT VISIT MOD MDM: CPT

## 2025-06-20 PROCEDURE — 82075 ASSAY OF BREATH ETHANOL: CPT | Performed by: PHYSICIAN ASSISTANT

## 2025-06-20 PROCEDURE — 87804 INFLUENZA ASSAY W/OPTIC: CPT | Performed by: PHYSICIAN ASSISTANT

## 2025-06-20 NOTE — ED NOTES
[facility] REFERRAL     [facility]    Name and Date of Birth:  Leann Cameron y.o. 1995    Date of Referral: June 20, 2025    Referral Source (Agency/Name): [facility]    Correct Demographics on file:  Yes     Emergency contact on file: Yes     Insurance on file: Yes     _____________________________________________      Presenting Symptoms and Stressors:      Please describe the reason for Referral: Auditory hallucinations, visual hallucinations    Stressors: chronic anxiety    Symptoms:  anxiety, auditory hallucinations, and visual hallucinations    Suicidal Ideation: None    Homicidal Ideation: None    Depressed Mood: Yes    Judy/Hypomania: None    Psychosis: None    Agitation: No    Appetite Changes: normal appetite    Sleep Disturbance: decreased sleep, disrupted sleep    Access to Weapons:  No    Smoking Status: denies use    Substance Use:  None  I    Current Psychiatrist or Therapist:    Psychiatrist: [facility]  Therapist:     Past Psychiatric Treatment: [place]    If currently Inpatient - Date of Anticipated discharge:     Diagnoses:  Psychosis  Anxiety Disorder    Do they Require Ambulatory Assistance: No    Communication Assistance: not required     Legal Issues: None        Bijal Robert

## 2025-06-20 NOTE — DISCHARGE INSTRUCTIONS
This writer discussed the patients current presentation and recommended discharge plan with Dr. Bliss    They agree with the patient being discharged at this time with referrals and/or information about: [facility]- [phone], [facility] [phone] and outpatient providers for therapy and psychiatry.      The patient was Instructed to follow up with: [facility]- [phone] & [facility]       This writer and the patient completed a safety plan.  The patient was provided with a copy of their safety plan with encouragement to utilize the plan following discharge.               SAFETY PLAN  Warning Signs (thoughts, images, mood, behavior, situations) increased anxiety and depression, thoughts of suicide        Coping Skills (what can I do to take my mind off the problem, or to keep myself safe): Reach out to your therapist and psychiatrist at [facility]. Engage in activities that promote wellness.  Use your support systems in place when you feel alone and return to the emergency department if symptoms worsen.              National Suicide Prevention Hotline:  1-959.975.9589     [facility] [phone] - Crisis   [facility] [phone] - LVF Crisis/Mobile Crisis   [phone] - [facility] Crisis   [facility]: [phone]  [facility]: [phone]   [facility] [phone] - Crisis   [facility] [phone] - Crisis      [facility] [phone] - Crisis   [facility] [phone] - Crisis   [phone] - Peer Support Talk Line (1-9pm daily)  [phone] - Teen Support Talk Line (1-9pm daily)  [phone] - [facility]   [facility] [phone]- Crisis    [facility] [phone] - Crisis   [facility] [phone] - Crisis    [facility]) [phone] - [facility]

## 2025-06-20 NOTE — ED NOTES
Patient is a 30 year old male presenting to the emergency department with stomach cramps and auditory/ visual hallucinations. He says they have been worsening but is unsure why. He has talked to his psychiatrist at [facility] 5 days ago and was recently started on atarax and seroquel. He hears voices saying they humiliate him and sometimes tell him to hurt himself but says he doesnt want to die. He adamantly denies wanting to hurt himself or others. He lives with his mother and step father who are supportive and feels safe in his environment. He says he came here to get help with his stomach cramps and see if there were other resources available. He says he is interested in attending a partial program to increase his treatment goals.

## 2025-06-20 NOTE — ED PROVIDER NOTES
Time reflects when diagnosis was documented in both MDM as applicable and the Disposition within this note       Time User Action Codes Description Comment    6/20/2025  9:12 AM Bijal Robert Add [R44.0] Auditory hallucinations     6/20/2025  9:12 AM Bijal Robert Add [F41.9] Anxiety     6/20/2025 10:29 AM Linda Farnsworth Add [F32.3] Depression, psychotic (HCC)           ED Disposition       ED Disposition   Discharge    Condition   Stable    Date/Time   Fri Jun 20, 2025 10:29 AM    Comment   Leann Mcnair discharge to home/self care.                   Assessment & Plan       Medical Decision Making  30y.o male presents to the ER for psychiatric evaluation. Vitals are stable. Patient is in no acute distress. On exam, breathing is non-labored. No tachypnea or accessory muscle use. Lungs are clear. Heart is regular rate and rhythm. Abdomen is not distended. He reports AH and VH. His mood and affect appear normal. He is calm and cooperative. He denies SI or HI. DDX consists of but not limited to: schizoaffective, depression, anxiety, psychosis, drug use. Will have Crisis see patient.    1030 - Patient seen by Crisis. Patient is not meeting inpatient criteria. Will discharge with Partial Program referral.    The management plan was discussed in detail with the patient at bedside and all questions were answered.  Prior to discharge, we provided both verbal and written instructions.  We discussed with the patient the signs and symptoms for which to return to the emergency department.  All questions were answered and patient was comfortable with the plan of care and discharged to home.  Instructed the patient to follow up with the primary care provider and/or specialist provided and their written instructions.  The patient verbalized understanding of our discussion and plan of care, and agrees to return to the Emergency Department for concerns and progression of illness.    At discharge, I instructed the patient  "to:  -follow up with pcp  -follow up with Partial Program  -return to the ER if symptoms worsened or new symptoms arose  Patient agreed to this plan and was stable at time of discharge.     Problems Addressed:  Depression, psychotic (HCC): acute illness or injury    Amount and/or Complexity of Data Reviewed  Independent Historian:      Details: Patient is historian  Labs: ordered.             Medications - No data to display    ED Risk Strat Scores                    No data recorded                            History of Present Illness       Chief Complaint   Patient presents with    Psychiatric Evaluation     Pt coming in via EMS stating hearing voices. Reports is med compliant but feels meds are not working, sees a psychiatrist but is requesting resources. C/o trouble sleeping and increased irritability. Pt denies SI/HI.    Abdominal Pain     Pt reports awaiting results of EGD, complains of \"upset stomach\".       Past Medical History[1]   Past Surgical History[2]   Family History[3]   Social History[4]   E-Cigarette/Vaping    E-Cigarette Use Never User       E-Cigarette/Vaping Substances    Nicotine Yes     THC No     CBD No     Flavoring No     Other No     Unknown No       I have reviewed and agree with the history as documented.     30y.o male with PMH of ADHD, adjustment disorder, anxiety, asthma, depression, psychosis, sleep difficulties, substance abuse and suicide attempt presents to the ER for psychiatric evaluation. Patient reports increased AH and VH. He reports he has been having them for the last 4 years but he feels they have worsened recently but he is unsure when. He denies SI or HI. He has been admitted in the past for symptoms. He follows with a psychiatrist and therapist at [facility]. He reports he has been taking his Seroquel as prescribed. He was recently started on Atarax also, which he has been taking. He lives with his mother and step father. He reports tobacco use. He denies " drug or alcohol use. He does not work or attend school. He reports sleeping 5-6 hours per day. Patient states he walked to [facility] this morning because he felt fatigued upon waking up. He also reports decreased smell and taste. He denies fever, chills, URI symptoms, chest pain, dyspnea, N/V/D, abdominal pain, weakness or paresthesias. He reports ongoing GI issues for which he is following with GI for. He reports having some abdominal pain usually in the morning that resolves throughout the day.      History provided by:  Patient   used: No        Review of Systems   Constitutional:  Positive for fatigue. Negative for activity change, appetite change, chills and fever.   HENT:  Negative for congestion, drooling, ear discharge, ear pain, facial swelling, rhinorrhea and sore throat.    Eyes:  Negative for redness.   Respiratory:  Negative for cough and shortness of breath.    Cardiovascular:  Negative for chest pain.   Gastrointestinal:  Negative for abdominal pain, diarrhea, nausea and vomiting.   Musculoskeletal:  Negative for neck stiffness.   Skin:  Negative for rash.   Allergic/Immunologic: Negative for food allergies.   Neurological:  Negative for weakness and numbness.   Psychiatric/Behavioral:  Positive for hallucinations. Negative for suicidal ideas.            Objective       ED Triage Vitals [06/20/25 0658]   Temperature Pulse Blood Pressure Respirations SpO2 Patient Position - Orthostatic VS   98.3 °F (36.8 °C) 95 163/99 16 97 % Sitting      Temp Source Heart Rate Source BP Location FiO2 (%) Pain Score    Oral Monitor Right arm -- --      Vitals      Date and Time Temp Pulse SpO2 Resp BP Pain Score FACES Pain Rating User   06/20/25 0658 98.3 °F (36.8 °C) 95 97 % 16 163/99 -- -- BR            Physical Exam  Vitals and nursing note reviewed.   Constitutional:       General: He is not in acute distress.     Appearance: He is not toxic-appearing.   HENT:      Head:  Normocephalic and atraumatic.      Mouth/Throat:      Lips: Pink. No lesions.      Mouth: Mucous membranes are moist.     Eyes:      Conjunctiva/sclera: Conjunctivae normal.     Neck:      Trachea: No tracheal deviation.     Cardiovascular:      Rate and Rhythm: Normal rate and regular rhythm.      Heart sounds: Normal heart sounds, S1 normal and S2 normal. No murmur heard.     No friction rub. No gallop.   Pulmonary:      Effort: Pulmonary effort is normal. No respiratory distress.      Breath sounds: Normal breath sounds. No decreased breath sounds, wheezing, rhonchi or rales.   Chest:      Chest wall: No tenderness.   Abdominal:      General: There is no distension.     Musculoskeletal:      Cervical back: Normal range of motion and neck supple.     Skin:     General: Skin is warm and dry.      Findings: No rash.     Neurological:      Mental Status: He is alert.      GCS: GCS eye subscore is 4. GCS verbal subscore is 5. GCS motor subscore is 6.     Psychiatric:         Attention and Perception: He perceives auditory and visual hallucinations.         Mood and Affect: Mood normal.         Speech: Speech normal.         Behavior: Behavior is actively hallucinating. Behavior is cooperative.         Thought Content: Thought content does not include homicidal or suicidal ideation.         Results Reviewed       Procedure Component Value Units Date/Time    FLU/COVID Rapid Antigen (30 min. TAT) - Preferred screening test in ED [[id number]]  (Normal) Collected: 06/20/25 0820    Lab Status: Final result Specimen: Nares from Nose Updated: 06/20/25 0905     SARS COV Rapid Antigen Negative     Influenza A Rapid Antigen Negative     Influenza B Rapid Antigen Negative    Narrative:      This test has been performed using the Quidel Hien 2 FLU+SARS Antigen test under the Emergency Use Authorization (EUA). This test has been validated by the  and verified by the performing laboratory. The Hien uses lateral flow  immunofluorescent sandwich assay to detect SARS-COV, Influenza A and Influenza B Antigen.     The Quidel Hien 2 SARS Antigen test does not differentiate between SARS-CoV and SARS-CoV-2.     Negative results are presumptive and may be confirmed with a molecular assay, if necessary, for patient management. Negative results do not rule out SARS-CoV-2 or influenza infection and should not be used as the sole basis for treatment or patient management decisions. A negative test result may occur if the level of antigen in a sample is below the limit of detection of this test.     Positive results are indicative of the presence of viral antigens, but do not rule out bacterial infection or co-infection with other viruses.     All test results should be used as an adjunct to clinical observations and other information available to the provider.    FOR PEDIATRIC PATIENTS - copy/paste COVID Guidelines URL to browser: [email or web address]    Rapid drug screen, urine [[id number]]  (Normal) Collected: 06/20/25 0820    Lab Status: Final result Specimen: Urine, Clean Catch Updated: 06/20/25 0848     Amph/Meth UR Negative     Barbiturate Ur Negative     Benzodiazepine Urine Negative     Cocaine Urine Negative     Methadone Urine Negative     Opiate Urine Negative     PCP Ur Negative     THC Urine Negative     Oxycodone Urine Negative     Fentanyl Urine Negative     HYDROCODONE URINE Negative    Narrative:      FOR MEDICAL PURPOSES ONLY.   IF CONFIRMATION NEEDED PLEASE CONTACT THE LAB WITHIN 5 DAYS.    Drug Screen Cutoff Levels:  AMPHETAMINE/METHAMPHETAMINES  1000 ng/mL  BARBITURATES     200 ng/mL  BENZODIAZEPINES     200 ng/mL  COCAINE      300 ng/mL  METHADONE      300 ng/mL  OPIATES      300 ng/mL  PHENCYCLIDINE     25 ng/mL  THC       50 ng/mL  OXYCODONE      100 ng/mL  FENTANYL      5 ng/mL  HYDROCODONE     300 ng/mL    POCT alcohol breath test [[id number]]  (Normal) Collected:  06/20/25 0820    Lab Status: Final result Updated: 06/20/25 0820     EXTBreath Alcohol 0.000            No orders to display       Procedures    ED Medication and Procedure Management   Prior to Admission Medications   Prescriptions Last Dose Informant Patient Reported? Taking?   OLANZapine (ZyPREXA) 15 mg tablet   No Yes   Sig: Take 1 tablet (15 mg total) by mouth daily at bedtime   OLANZapine (ZyPREXA) 5 mg tablet   No No   Sig: Take 1 tablet (5 mg total) by mouth daily   Patient not taking: Reported on 5/2/2025   QUEtiapine (SEROquel) 50 mg tablet 6/19/2025 Evening  Yes Yes   Sig: Take 50 mg by mouth 2 (two) times a day   benztropine (COGENTIN) 1 mg tablet   No Yes   Sig: Take 1 tablet (1 mg total) by mouth daily   dicyclomine (BENTYL) 10 mg capsule Not Taking  No No   Sig: Take 1 capsule (10 mg total) by mouth 3 (three) times a day as needed (abdominal pain / discomfort)   Patient not taking: Reported on 6/20/2025   hydrOXYzine HCL (ATARAX) 25 mg tablet   No Yes   Sig: Take 1 tablet (25 mg total) by mouth 2 (two) times a day as needed for itching   lisinopril (ZESTRIL) 20 mg tablet 6/19/2025 Morning  Yes Yes   Sig: Take 20 mg by mouth daily   lisinopril (ZESTRIL) 5 mg tablet   No No   Sig: Take 2 tablets (10 mg total) by mouth daily   Patient not taking: Reported on 6/5/2025   omeprazole (PriLOSEC) 40 MG capsule 6/20/2025 Morning  No Yes   Sig: Take 1 capsule (40 mg total) by mouth 2 (two) times a day before meals   traZODone (DESYREL) 50 mg tablet Not Taking  Yes No   Patient not taking: Reported on 6/20/2025      Facility-Administered Medications: None     Discharge Medication List as of 6/20/2025 10:30 AM        CONTINUE these medications which have NOT CHANGED    Details   benztropine (COGENTIN) 1 mg tablet Take 1 tablet (1 mg total) by mouth daily, Starting Fri 4/25/2025, Until Fri 6/20/2025, Normal      hydrOXYzine HCL (ATARAX) 25 mg tablet Take 1 tablet (25 mg total) by mouth 2 (two) times a day as needed  for itching, Starting Fri 4/25/2025, Until Fri 6/20/2025 at 2359, Normal      lisinopril (ZESTRIL) 20 mg tablet Take 20 mg by mouth daily, Starting Sat 5/10/2025, Historical Med      OLANZapine (ZyPREXA) 15 mg tablet Take 1 tablet (15 mg total) by mouth daily at bedtime, Starting Fri 4/25/2025, Until Fri 6/20/2025, Normal      omeprazole (PriLOSEC) 40 MG capsule Take 1 capsule (40 mg total) by mouth 2 (two) times a day before meals, Starting Mon 6/16/2025, Until Sun 9/14/2025, Normal      QUEtiapine (SEROquel) 50 mg tablet Take 50 mg by mouth 2 (two) times a day, Starting Tue 6/10/2025, Historical Med      dicyclomine (BENTYL) 10 mg capsule Take 1 capsule (10 mg total) by mouth 3 (three) times a day as needed (abdominal pain / discomfort), Starting Fri 5/2/2025, Normal      traZODone (DESYREL) 50 mg tablet Historical Med             ED SEPSIS DOCUMENTATION   Time reflects when diagnosis was documented in both MDM as applicable and the Disposition within this note       Time User Action Codes Description Comment    6/20/2025  9:12 AM Bijal Robert [R44.0] Auditory hallucinations     6/20/2025  9:12 AM Bijal Robert [F41.9] Anxiety     6/20/2025 10:29 AM Linda Farnsworth Add [F32.3] Depression, psychotic (HCC)                      [1]   Past Medical History:  Diagnosis Date    ADHD     Adjustment disorder     Allergic     Anxiety     Asthma     Depression     Dysuria     Last assessed - 1/24/14    Hallucination     Psychosis (HCC)     Sleep difficulties     Substance abuse (HCC)     Suicide attempt (HCC)    [2] No past surgical history on file.  [3]   Family History  Problem Relation Name Age of Onset    RACHID disease Mother          Reflux    Other Mother          Rhinitis    No Known Problems Father      Allergies Cousin          Allergic Disorder    Asthma Cousin     [4]   Social History  Tobacco Use    Smoking status: Some Days     Current packs/day: 0.25     Average packs/day: 0.3 packs/day for 9.0  years (2.2 ttl pk-yrs)     Types: Cigarettes, Cigars     Start date: 6/27/2016    Smokeless tobacco: Never    Tobacco comments:     2-3 cigars/week   Vaping Use    Vaping status: Never Used   Substance Use Topics    Alcohol use: Yes     Alcohol/week: 2.0 standard drinks of alcohol     Types: 2 Cans of beer per week     Comment: socially    Drug use: Not Currently     Types: Marijuana, Amphetamines, Methamphetamines     Comment: UDS negative 4/18/2025        Linda Farnsworth PA-C  06/20/25 1147

## 2025-06-23 ENCOUNTER — RESULTS FOLLOW-UP (OUTPATIENT)
Dept: GASTROENTEROLOGY | Facility: CLINIC | Age: 30
End: 2025-06-23

## 2025-06-23 ENCOUNTER — PREP FOR PROCEDURE (OUTPATIENT)
Dept: GASTROENTEROLOGY | Facility: CLINIC | Age: 30
End: 2025-06-23

## 2025-06-23 DIAGNOSIS — K20.0 EOSINOPHILIC ESOPHAGITIS: Primary | ICD-10-CM

## 2025-06-23 RX ORDER — SODIUM CHLORIDE, SODIUM LACTATE, POTASSIUM CHLORIDE, CALCIUM CHLORIDE 600; 310; 30; 20 MG/100ML; MG/100ML; MG/100ML; MG/100ML
125 INJECTION, SOLUTION INTRAVENOUS CONTINUOUS
OUTPATIENT
Start: 2025-06-23

## 2025-06-23 NOTE — TELEPHONE ENCOUNTER
----- Message from Vivien TRIMBLE sent at 6/23/2025  9:45 AM EDT -----    ----- Message -----  From: Larry Foley MD  Sent: 6/23/2025   9:00 AM EDT  To: [facility]#    Dear staff: Please arrange EGD in 6 to 8 weeks from today's date.  Please make sure that patient will be taking 1 day of clear liquid diet prior to date of the the procedure and would be taking   omeprazole 40 mg twice daily until the time of the endoscopy.  ----- Message -----  From: Lab, Background User  Sent: 6/18/2025   3:08 PM EDT  To: Larry Foley MD

## 2025-06-23 NOTE — TELEPHONE ENCOUNTER
Spoke with patient, scheduled 6-8WK repeat EGD for further dilation of the esophageal stricture with Dr. Larry Foley on 8/11/2025 @ the [facility] [place]. Pt confirmed procedure date. Mailed EGD procedure prep instructions to patient. Confirmed pt's address.    Scheduled date of 6-8WK repeat EGD (as of today): 8/11/2025  Physician performing EGD: Dr. RL Foley  Location of EGD: [facility]  Desired bowel prep reviewed with patient: EGD proc prep instructions mailed to patient as per pt's request  Instructions reviewed with patient by: Tonja  Clearances:  N/A

## 2025-07-29 ENCOUNTER — ANESTHESIA EVENT (OUTPATIENT)
Dept: ANESTHESIOLOGY | Facility: HOSPITAL | Age: 30
End: 2025-07-29

## 2025-07-29 ENCOUNTER — ANESTHESIA (OUTPATIENT)
Dept: ANESTHESIOLOGY | Facility: HOSPITAL | Age: 30
End: 2025-07-29

## 2025-08-11 ENCOUNTER — ANESTHESIA EVENT (OUTPATIENT)
Dept: GASTROENTEROLOGY | Facility: MEDICAL CENTER | Age: 30
End: 2025-08-11
Payer: MEDICARE

## 2025-08-11 ENCOUNTER — ANESTHESIA (OUTPATIENT)
Dept: GASTROENTEROLOGY | Facility: MEDICAL CENTER | Age: 30
End: 2025-08-11
Payer: MEDICARE

## 2025-08-11 ENCOUNTER — HOSPITAL ENCOUNTER (OUTPATIENT)
Dept: GASTROENTEROLOGY | Facility: MEDICAL CENTER | Age: 30
Setting detail: OUTPATIENT SURGERY
Discharge: HOME | End: 2025-08-11
Attending: INTERNAL MEDICINE
Payer: MEDICARE